# Patient Record
Sex: MALE | Race: WHITE | Employment: OTHER | URBAN - METROPOLITAN AREA
[De-identification: names, ages, dates, MRNs, and addresses within clinical notes are randomized per-mention and may not be internally consistent; named-entity substitution may affect disease eponyms.]

---

## 2017-03-31 PROBLEM — I25.10 CORONARY ARTERY DISEASE INVOLVING NATIVE HEART WITHOUT ANGINA PECTORIS: Status: ACTIVE | Noted: 2017-03-31

## 2018-09-20 PROBLEM — Z91.199 NO-SHOW FOR APPOINTMENT: Status: ACTIVE | Noted: 2018-09-20

## 2018-10-09 PROBLEM — M17.0 PRIMARY OSTEOARTHRITIS OF BOTH KNEES: Status: ACTIVE | Noted: 2018-10-09

## 2018-10-09 PROBLEM — I69.359 HEMIPARESIS AFFECTING DOMINANT SIDE AS LATE EFFECT OF CEREBROVASCULAR ACCIDENT (HCC): Status: ACTIVE | Noted: 2018-10-09

## 2019-10-15 ENCOUNTER — HOSPITAL ENCOUNTER (OUTPATIENT)
Dept: GENERAL RADIOLOGY | Age: 84
Discharge: HOME | End: 2019-10-15
Payer: MEDICARE

## 2019-10-15 DIAGNOSIS — M25.512 ACUTE PAIN OF LEFT SHOULDER: ICD-10-CM

## 2019-10-15 PROBLEM — Z91.199 NO-SHOW FOR APPOINTMENT: Status: RESOLVED | Noted: 2018-09-20 | Resolved: 2019-10-15

## 2019-10-15 PROCEDURE — 73030 X-RAY EXAM OF SHOULDER: CPT

## 2019-10-17 NOTE — PROGRESS NOTES
Left message for patient to return call
Left message for patient to return call-tried both home and cell number
Patient notified and appt scheduled for 10/18/19 at 9 am for shoulder injection.
Please call patient, no evidence of fracture on xray. Please schedule the patient for shoulder injection with me in the next few days.   Bartolo Tang MD
home

## 2020-04-21 ENCOUNTER — HOSPITAL ENCOUNTER (OUTPATIENT)
Dept: GENERAL RADIOLOGY | Age: 85
Discharge: HOME | End: 2020-04-21

## 2020-04-21 DIAGNOSIS — M54.50 ACUTE BILATERAL LOW BACK PAIN WITHOUT SCIATICA: ICD-10-CM

## 2020-04-22 PROBLEM — S32.020D COMPRESSION FRACTURE OF L2 VERTEBRA WITH ROUTINE HEALING: Status: ACTIVE | Noted: 2020-04-22

## 2020-04-22 PROBLEM — R29.6 FALLS FREQUENTLY: Status: ACTIVE | Noted: 2020-04-22

## 2020-09-09 ENCOUNTER — HOSPITAL ENCOUNTER (EMERGENCY)
Age: 85
Discharge: HOME | End: 2020-09-09
Attending: EMERGENCY MEDICINE
Payer: MEDICARE

## 2020-09-09 ENCOUNTER — APPOINTMENT (OUTPATIENT)
Dept: GENERAL RADIOLOGY | Age: 85
End: 2020-09-09
Attending: EMERGENCY MEDICINE
Payer: MEDICARE

## 2020-09-09 ENCOUNTER — APPOINTMENT (OUTPATIENT)
Dept: CT IMAGING | Age: 85
End: 2020-09-09
Attending: EMERGENCY MEDICINE
Payer: MEDICARE

## 2020-09-09 VITALS
HEART RATE: 61 BPM | TEMPERATURE: 98 F | OXYGEN SATURATION: 95 % | DIASTOLIC BLOOD PRESSURE: 95 MMHG | SYSTOLIC BLOOD PRESSURE: 197 MMHG | RESPIRATION RATE: 22 BRPM

## 2020-09-09 DIAGNOSIS — R41.82 ALTERED MENTAL STATUS, UNSPECIFIED ALTERED MENTAL STATUS TYPE: Primary | ICD-10-CM
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
## 2020-09-09 LAB
ALBUMIN SERPL-MCNC: 3.4 G/DL (ref 3.2–4.6)
ALBUMIN/GLOB SERPL: 1.1 {RATIO} (ref 1.2–3.5)
ALP SERPL-CCNC: 67 U/L (ref 50–136)
ALT SERPL-CCNC: 18 U/L (ref 12–65)
ANION GAP SERPL CALC-SCNC: 5 MMOL/L (ref 7–16)
AST SERPL-CCNC: 24 U/L (ref 15–37)
BACTERIA URNS QL MICRO: 0 /HPF
BASOPHILS # BLD: 0 K/UL (ref 0–0.2)
BASOPHILS NFR BLD: 1 % (ref 0–2)
BILIRUB SERPL-MCNC: 0.7 MG/DL (ref 0.2–1.1)
BUN SERPL-MCNC: 13 MG/DL (ref 8–23)
CALCIUM SERPL-MCNC: 8.7 MG/DL (ref 8.3–10.4)
CASTS URNS QL MICRO: 0 /LPF
CHLORIDE SERPL-SCNC: 108 MMOL/L (ref 98–107)
CO2 SERPL-SCNC: 31 MMOL/L (ref 21–32)
CREAT SERPL-MCNC: 1.08 MG/DL (ref 0.8–1.5)
DIFFERENTIAL METHOD BLD: ABNORMAL
EOSINOPHIL # BLD: 0.1 K/UL (ref 0–0.8)
EOSINOPHIL NFR BLD: 1 % (ref 0.5–7.8)
EPI CELLS #/AREA URNS HPF: 0 /HPF
ERYTHROCYTE [DISTWIDTH] IN BLOOD BY AUTOMATED COUNT: 14.4 % (ref 11.9–14.6)
ETHANOL SERPL-MCNC: 162 MG/DL
GLOBULIN SER CALC-MCNC: 3.2 G/DL (ref 2.3–3.5)
GLUCOSE SERPL-MCNC: 98 MG/DL (ref 65–100)
HCT VFR BLD AUTO: 42.5 % (ref 41.1–50.3)
HGB BLD-MCNC: 13.7 G/DL (ref 13.6–17.2)
IMM GRANULOCYTES # BLD AUTO: 0 K/UL (ref 0–0.5)
IMM GRANULOCYTES NFR BLD AUTO: 1 % (ref 0–5)
LYMPHOCYTES # BLD: 1.3 K/UL (ref 0.5–4.6)
LYMPHOCYTES NFR BLD: 30 % (ref 13–44)
MCH RBC QN AUTO: 31.5 PG (ref 26.1–32.9)
MCHC RBC AUTO-ENTMCNC: 32.2 G/DL (ref 31.4–35)
MCV RBC AUTO: 97.7 FL (ref 79.6–97.8)
MONOCYTES # BLD: 0.3 K/UL (ref 0.1–1.3)
MONOCYTES NFR BLD: 6 % (ref 4–12)
NEUTS SEG # BLD: 2.8 K/UL (ref 1.7–8.2)
NEUTS SEG NFR BLD: 62 % (ref 43–78)
NRBC # BLD: 0 K/UL (ref 0–0.2)
PLATELET # BLD AUTO: 78 K/UL (ref 150–450)
PMV BLD AUTO: 9.7 FL (ref 9.4–12.3)
POTASSIUM SERPL-SCNC: 3.3 MMOL/L (ref 3.5–5.1)
PROT SERPL-MCNC: 6.6 G/DL (ref 6.3–8.2)
RBC # BLD AUTO: 4.35 M/UL (ref 4.23–5.6)
RBC #/AREA URNS HPF: NORMAL /HPF
SODIUM SERPL-SCNC: 144 MMOL/L (ref 136–145)
WBC # BLD AUTO: 4.4 K/UL (ref 4.3–11.1)
WBC URNS QL MICRO: 0 /HPF

## 2020-09-09 PROCEDURE — 71045 X-RAY EXAM CHEST 1 VIEW: CPT

## 2020-09-09 PROCEDURE — 93005 ELECTROCARDIOGRAM TRACING: CPT | Performed by: EMERGENCY MEDICINE

## 2020-09-09 PROCEDURE — 85025 COMPLETE CBC W/AUTO DIFF WBC: CPT

## 2020-09-09 PROCEDURE — 80053 COMPREHEN METABOLIC PANEL: CPT

## 2020-09-09 PROCEDURE — 74011250637 HC RX REV CODE- 250/637: Performed by: EMERGENCY MEDICINE

## 2020-09-09 PROCEDURE — 74011250636 HC RX REV CODE- 250/636: Performed by: EMERGENCY MEDICINE

## 2020-09-09 PROCEDURE — 96361 HYDRATE IV INFUSION ADD-ON: CPT

## 2020-09-09 PROCEDURE — 81015 MICROSCOPIC EXAM OF URINE: CPT

## 2020-09-09 PROCEDURE — 70450 CT HEAD/BRAIN W/O DYE: CPT

## 2020-09-09 PROCEDURE — 80307 DRUG TEST PRSMV CHEM ANLYZR: CPT

## 2020-09-09 PROCEDURE — 99285 EMERGENCY DEPT VISIT HI MDM: CPT

## 2020-09-09 PROCEDURE — 96360 HYDRATION IV INFUSION INIT: CPT

## 2020-09-09 RX ORDER — AMLODIPINE BESYLATE 5 MG/1
5 TABLET ORAL DAILY
Status: DISCONTINUED | OUTPATIENT
Start: 2020-09-09 | End: 2020-09-10 | Stop reason: HOSPADM

## 2020-09-09 RX ORDER — AMLODIPINE BESYLATE 5 MG/1
5 TABLET ORAL DAILY
Status: DISCONTINUED | OUTPATIENT
Start: 2020-09-10 | End: 2020-09-09

## 2020-09-09 RX ADMIN — AMLODIPINE BESYLATE 5 MG: 5 TABLET ORAL at 21:24

## 2020-09-09 RX ADMIN — SODIUM CHLORIDE 500 ML: 900 INJECTION, SOLUTION INTRAVENOUS at 20:04

## 2020-09-09 NOTE — ED PROVIDER NOTES
If some altered function and lack of clarity that probably been going on all through the day. He has not been moving his left leg quite as well as he normally does so he has a deficit that is residual from a stroke involving this. Last normal was probably at least yesterday. Son who also provides much of the history states that he has had some general decline over the last 30 days or so. He continues to live with his wife with no significant daily support he does use a cane to get around. The history is provided by the patient and a relative (son in ER). Alcohol intoxication   Primary symptoms include: intoxication. There areno somnolence, no loss of consciousness, no seizures and no hallucinations present at this time. This is a new problem. Suspected agents include alcohol. Pertinent negatives include no fever.         Past Medical History:   Diagnosis Date    CAD (coronary artery disease)     Cholelithiasis 1/2014    Incidental Finding on CT Chest    Dyslipidemia     Hemorrhagic cerebrovascular accident (CVA) ([address] 5/19/2015    R basal ganglia bleed on CT scan head 5/19/15     Hypertension     PVD (peripheral vascular disease) ([address]     CXR 12/2013 - Moderate Atheroscleroitc Change in Aorta    SDH (subdural hematoma) (HCC) 5/19/2015    Skin cancer     Traumatic subdural hematoma (HCC) 5/19/2015    R sided SDH with antecedent h/o of multiple falls in weeks prior        Past Surgical History:   Procedure Laterality Date    HX CORONARY STENT PLACEMENT      LAD stent    HX MALIGNANT SKIN LESION EXCISION      right side of face         Family History:   Problem Relation Age of Onset    Cancer Brother        Social History     Socioeconomic History    Marital status:      Spouse name: Not on file    Number of children: Not on file    Years of education: Not on file    Highest education level: Not on file   Occupational History    Not on file   Social Needs    Financial resource strain: Not on file    Food insecurity     Worry: Not on file     Inability: Not on file    Transportation needs     Medical: Not on file     Non-medical: Not on file   Tobacco Use    Smoking status: Never Smoker    Smokeless tobacco: Never Used   Substance and Sexual Activity    Alcohol use: Yes     Comment: occass    Drug use: No    Sexual activity: Not on file   Lifestyle    Physical activity     Days per week: Not on file     Minutes per session: Not on file    Stress: Not on file   Relationships    Social connections     Talks on phone: Not on file     Gets together: Not on file     Attends Christian service: Not on file     Active member of club or organization: Not on file     Attends meetings of clubs or organizations: Not on file     Relationship status: Not on file    Intimate partner violence     Fear of current or ex partner: Not on file     Emotionally abused: Not on file     Physically abused: Not on file     Forced sexual activity: Not on file   Other Topics Concern    Not on file   Social History Narrative    Not on file         ALLERGIES: Patient has no known allergies. Review of Systems   Constitutional: Negative for chills, diaphoresis and fever. Respiratory: Negative for cough and wheezing. Neurological: Negative for seizures, loss of consciousness, facial asymmetry, speech difficulty, light-headedness and headaches. Psychiatric/Behavioral: Positive for decreased concentration. Negative for hallucinations. All other systems reviewed and are negative. Vitals:    09/09/20 1903 09/09/20 1905   BP: 158/87    Pulse:  (!) 58   Resp:  16   SpO2:  96%            Physical Exam  Vitals signs and nursing note reviewed. Constitutional:       General: He is not in acute distress. Appearance: He is well-developed. He is not toxic-appearing or diaphoretic. HENT:      Head: Atraumatic.       Right Ear: External ear normal.      Left Ear: External ear normal.   Eyes: General: No scleral icterus. Neck:      Musculoskeletal: Neck supple. Cardiovascular:      Rate and Rhythm: Normal rate. Pulmonary:      Effort: Pulmonary effort is normal. No respiratory distress. Abdominal:      Palpations: Abdomen is soft. Tenderness: There is no right CVA tenderness, left CVA tenderness, guarding or rebound. Musculoskeletal:         General: No tenderness or signs of injury. Skin:     General: Skin is warm and dry. Neurological:      General: No focal deficit present. Mental Status: He is alert. He is disoriented. Psychiatric:         Thought Content: Thought content normal.          MDM  Number of Diagnoses or Management Options  Altered mental status, unspecified altered mental status type:   Blood alcohol level of 120-199 mg/100 ml:   Diagnosis management comments: Progressive clearing of issues in ER and with this and blood alcohol on arrival and no other focal present cause feel this most likely issue.  Son very engaged in parent's care and will follow closely       Amount and/or Complexity of Data Reviewed  Clinical lab tests: reviewed and ordered  Tests in the radiology section of CPT®: reviewed and ordered  Obtain history from someone other than the patient: yes  Independent visualization of images, tracings, or specimens: yes    Risk of Complications, Morbidity, and/or Mortality  Presenting problems: moderate  Diagnostic procedures: low  Management options: moderate    Patient Progress  Patient progress: stable         Procedures

## 2020-09-09 NOTE — ED TRIAGE NOTES
Patient presents via EMS. Son wanted patient to be seen for left sided leg weakness. Patient has hx of stroke with left sided weakness, but son says it is worse today. States his dad has been drinking alcohol today. Patient A&Ox4.    Patient masked on arrival.

## 2020-09-10 LAB
ATRIAL RATE: 340 BPM
ATRIAL RATE: 57 BPM
CALCULATED R AXIS, ECG10: 35 DEGREES
CALCULATED R AXIS, ECG10: 40 DEGREES
CALCULATED T AXIS, ECG11: -44 DEGREES
CALCULATED T AXIS, ECG11: 44 DEGREES
DIAGNOSIS, 93000: NORMAL
DIAGNOSIS, 93000: NORMAL
Q-T INTERVAL, ECG07: 436 MS
Q-T INTERVAL, ECG07: 440 MS
QRS DURATION, ECG06: 72 MS
QRS DURATION, ECG06: 80 MS
QTC CALCULATION (BEZET), ECG08: 424 MS
QTC CALCULATION (BEZET), ECG08: 440 MS
VENTRICULAR RATE, ECG03: 57 BPM
VENTRICULAR RATE, ECG03: 60 BPM

## 2020-09-10 NOTE — DISCHARGE INSTRUCTIONS
Present CT scan shows no acute injury/bleed/mass or other abnormality  Electrolytes essentially normal along with hematologic studies  Urine shows no evidence of infection  Blood alcohol 162 (over 80 is considered intoxicated)  Make certain he is taking his medications as prescribed

## 2020-09-10 NOTE — ED NOTES
I have reviewed discharge instructions with the patient. The patient verbalized understanding. Patient left ED via Discharge Method: wheelchair to Home with son. Opportunity for questions and clarification provided. Patient given 0 scripts. The pt was in no acute distress at [place].

## 2020-09-21 PROBLEM — R39.14 BENIGN PROSTATIC HYPERPLASIA WITH INCOMPLETE BLADDER EMPTYING: Status: ACTIVE | Noted: 2020-09-21

## 2020-09-21 PROBLEM — H40.1130 PRIMARY OPEN ANGLE GLAUCOMA (POAG) OF BOTH EYES: Status: ACTIVE | Noted: 2020-09-21

## 2020-09-21 PROBLEM — F10.27 DEMENTIA ASSOCIATED WITH ALCOHOLISM WITH BEHAVIORAL DISTURBANCE (HCC): Status: ACTIVE | Noted: 2020-09-21

## 2020-09-21 PROBLEM — N40.1 BENIGN PROSTATIC HYPERPLASIA WITH INCOMPLETE BLADDER EMPTYING: Status: ACTIVE | Noted: 2020-09-21

## 2021-08-03 PROBLEM — I25.10 CAD (CORONARY ARTERY DISEASE): Status: RESOLVED | Noted: 2021-08-03 | Resolved: 2021-08-03

## 2022-03-18 PROBLEM — I25.10 CORONARY ARTERY DISEASE INVOLVING NATIVE HEART WITHOUT ANGINA PECTORIS: Status: ACTIVE | Noted: 2017-03-31

## 2022-03-18 PROBLEM — M17.0 PRIMARY OSTEOARTHRITIS OF BOTH KNEES: Status: ACTIVE | Noted: 2018-10-09

## 2022-03-19 PROBLEM — N40.1 BENIGN PROSTATIC HYPERPLASIA WITH INCOMPLETE BLADDER EMPTYING: Status: ACTIVE | Noted: 2020-09-21

## 2022-03-19 PROBLEM — R29.6 FALLS FREQUENTLY: Status: ACTIVE | Noted: 2020-04-22

## 2022-03-19 PROBLEM — F10.27 DEMENTIA ASSOCIATED WITH ALCOHOLISM WITH BEHAVIORAL DISTURBANCE (HCC): Status: ACTIVE | Noted: 2020-09-21

## 2022-03-19 PROBLEM — R39.14 BENIGN PROSTATIC HYPERPLASIA WITH INCOMPLETE BLADDER EMPTYING: Status: ACTIVE | Noted: 2020-09-21

## 2022-03-19 PROBLEM — I69.359 HEMIPARESIS AFFECTING DOMINANT SIDE AS LATE EFFECT OF CEREBROVASCULAR ACCIDENT (HCC): Status: ACTIVE | Noted: 2018-10-09

## 2022-03-20 PROBLEM — S32.020D COMPRESSION FRACTURE OF L2 VERTEBRA WITH ROUTINE HEALING: Status: ACTIVE | Noted: 2020-04-22

## 2022-03-20 PROBLEM — H40.1130 PRIMARY OPEN ANGLE GLAUCOMA (POAG) OF BOTH EYES: Status: ACTIVE | Noted: 2020-09-21

## 2022-05-27 ENCOUNTER — HOSPITAL ENCOUNTER (INPATIENT)
Age: 87
LOS: 5 days | Discharge: NURSING, REHAB OR LONG-TERM CARE | DRG: 564 | End: 2022-06-01
Attending: EMERGENCY MEDICINE | Admitting: INTERNAL MEDICINE
Payer: MEDICARE

## 2022-05-27 ENCOUNTER — HOSPITAL ENCOUNTER (EMERGENCY)
Dept: CT IMAGING | Age: 87
Discharge: HOME | DRG: 564 | End: 2022-05-30
Payer: MEDICARE

## 2022-05-27 DIAGNOSIS — Z78.9 UNABLE TO CARE FOR SELF: Primary | ICD-10-CM
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
## 2022-05-27 DIAGNOSIS — F03.90 DEMENTIA WITHOUT BEHAVIORAL DISTURBANCE, UNSPECIFIED DEMENTIA TYPE: ICD-10-CM
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
## 2022-05-27 PROBLEM — M62.82 RHABDOMYOLYSIS: Status: ACTIVE | Noted: 2022-05-27

## 2022-05-27 PROBLEM — R29.6 FALLS FREQUENTLY: Status: ACTIVE | Noted: 2020-04-22

## 2022-05-27 PROBLEM — F10.27 DEMENTIA ASSOCIATED WITH ALCOHOLISM WITH BEHAVIORAL DISTURBANCE (HCC): Status: ACTIVE | Noted: 2020-09-21

## 2022-05-27 LAB
ALBUMIN SERPL-MCNC: 3.2 G/DL (ref 3.2–4.6)
ALBUMIN/GLOB SERPL: 1 {RATIO} (ref 1.2–3.5)
ALP SERPL-CCNC: 91 U/L (ref 50–136)
ALT SERPL-CCNC: 19 U/L (ref 12–65)
ANION GAP SERPL CALC-SCNC: 5 MMOL/L (ref 7–16)
APPEARANCE UR: CLEAR
AST SERPL-CCNC: 30 U/L (ref 15–37)
BACTERIA URNS QL MICRO: 0 /HPF
BASOPHILS # BLD: 0 K/UL (ref 0–0.2)
BASOPHILS NFR BLD: 1 % (ref 0–2)
BILIRUB SERPL-MCNC: 1.9 MG/DL (ref 0.2–1.1)
BILIRUB UR QL: NEGATIVE
BUN SERPL-MCNC: 14 MG/DL (ref 8–23)
CALCIUM SERPL-MCNC: 8.9 MG/DL (ref 8.3–10.4)
CASTS URNS QL MICRO: 0 /LPF
CHLORIDE SERPL-SCNC: 108 MMOL/L (ref 98–107)
CK SERPL-CCNC: 335 U/L (ref 21–215)
CO2 SERPL-SCNC: 30 MMOL/L (ref 21–32)
COLOR UR: YELLOW
CREAT SERPL-MCNC: 1.22 MG/DL (ref 0.8–1.5)
DIFFERENTIAL METHOD BLD: ABNORMAL
EOSINOPHIL # BLD: 0 K/UL (ref 0–0.8)
EOSINOPHIL NFR BLD: 1 % (ref 0.5–7.8)
EPI CELLS #/AREA URNS HPF: ABNORMAL /HPF
ERYTHROCYTE [DISTWIDTH] IN BLOOD BY AUTOMATED COUNT: 14.8 % (ref 11.9–14.6)
GLOBULIN SER CALC-MCNC: 3.1 G/DL (ref 2.3–3.5)
GLUCOSE SERPL-MCNC: 118 MG/DL (ref 65–100)
GLUCOSE UR STRIP.AUTO-MCNC: NEGATIVE MG/DL
HCT VFR BLD AUTO: 35.8 % (ref 41.1–50.3)
HGB BLD-MCNC: 11.3 G/DL (ref 13.6–17.2)
HGB UR QL STRIP: ABNORMAL
IMM GRANULOCYTES # BLD AUTO: 0 K/UL (ref 0–0.5)
IMM GRANULOCYTES NFR BLD AUTO: 0 % (ref 0–5)
KETONES UR QL STRIP.AUTO: NEGATIVE MG/DL
LEUKOCYTE ESTERASE UR QL STRIP.AUTO: NEGATIVE
LYMPHOCYTES # BLD: 0.8 K/UL (ref 0.5–4.6)
LYMPHOCYTES NFR BLD: 19 % (ref 13–44)
MCH RBC QN AUTO: 30.5 PG (ref 26.1–32.9)
MCHC RBC AUTO-ENTMCNC: 31.6 G/DL (ref 31.4–35)
MCV RBC AUTO: 96.5 FL (ref 79.6–97.8)
MONOCYTES # BLD: 0.3 K/UL (ref 0.1–1.3)
MONOCYTES NFR BLD: 6 % (ref 4–12)
NEUTS SEG # BLD: 3.2 K/UL (ref 1.7–8.2)
NEUTS SEG NFR BLD: 73 % (ref 43–78)
NITRITE UR QL STRIP.AUTO: NEGATIVE
NRBC # BLD: 0 K/UL (ref 0–0.2)
PH UR STRIP: 6 [PH] (ref 5–9)
PLATELET # BLD AUTO: 64 K/UL (ref 150–450)
PMV BLD AUTO: 10.1 FL (ref 9.4–12.3)
POTASSIUM SERPL-SCNC: 3.3 MMOL/L (ref 3.5–5.1)
PROT SERPL-MCNC: 6.3 G/DL (ref 6.3–8.2)
PROT UR STRIP-MCNC: NEGATIVE MG/DL
RBC # BLD AUTO: 3.71 M/UL (ref 4.23–5.6)
RBC #/AREA URNS HPF: ABNORMAL /HPF
SODIUM SERPL-SCNC: 143 MMOL/L (ref 136–145)
SP GR UR REFRACTOMETRY: 1.01 (ref 1–1.02)
UROBILINOGEN UR QL STRIP.AUTO: 1 EU/DL (ref 0.2–1)
WBC # BLD AUTO: 4.3 K/UL (ref 4.3–11.1)
WBC URNS QL MICRO: ABNORMAL /HPF

## 2022-05-27 PROCEDURE — 2580000003 HC RX 258: Performed by: NURSE PRACTITIONER

## 2022-05-27 PROCEDURE — 1100000000 HC RM PRIVATE

## 2022-05-27 PROCEDURE — 80053 COMPREHEN METABOLIC PANEL: CPT

## 2022-05-27 PROCEDURE — 85025 COMPLETE CBC W/AUTO DIFF WBC: CPT

## 2022-05-27 PROCEDURE — 6370000000 HC RX 637 (ALT 250 FOR IP): Performed by: NURSE PRACTITIONER

## 2022-05-27 PROCEDURE — 70450 CT HEAD/BRAIN W/O DYE: CPT

## 2022-05-27 PROCEDURE — 2500000003 HC RX 250 WO HCPCS: Performed by: NURSE PRACTITIONER

## 2022-05-27 PROCEDURE — 99285 EMERGENCY DEPT VISIT HI MDM: CPT

## 2022-05-27 PROCEDURE — 82550 ASSAY OF CK (CPK): CPT

## 2022-05-27 PROCEDURE — 6360000002 HC RX W HCPCS: Performed by: NURSE PRACTITIONER

## 2022-05-27 RX ORDER — SODIUM CHLORIDE 0.9 % (FLUSH) 0.9 %
5-40 SYRINGE (ML) INJECTION EVERY 12 HOURS SCHEDULED
Status: DISCONTINUED | OUTPATIENT
Start: 2022-05-27 | End: 2022-06-01 | Stop reason: HOSPADM

## 2022-05-27 RX ORDER — ATORVASTATIN CALCIUM 40 MG/1
40 TABLET, FILM COATED ORAL DAILY
Status: DISCONTINUED | OUTPATIENT
Start: 2022-05-27 | End: 2022-06-01 | Stop reason: HOSPADM

## 2022-05-27 RX ORDER — POLYETHYLENE GLYCOL 3350 17 G/17G
17 POWDER, FOR SOLUTION ORAL DAILY PRN
Status: DISCONTINUED | OUTPATIENT
Start: 2022-05-27 | End: 2022-06-01 | Stop reason: HOSPADM

## 2022-05-27 RX ORDER — SODIUM CHLORIDE 9 MG/ML
INJECTION, SOLUTION INTRAVENOUS PRN
Status: DISCONTINUED | OUTPATIENT
Start: 2022-05-27 | End: 2022-06-01 | Stop reason: HOSPADM

## 2022-05-27 RX ORDER — ACETAMINOPHEN 325 MG/1
650 TABLET ORAL EVERY 6 HOURS PRN
Status: DISCONTINUED | OUTPATIENT
Start: 2022-05-27 | End: 2022-06-01 | Stop reason: HOSPADM

## 2022-05-27 RX ORDER — SODIUM CHLORIDE 0.9 % (FLUSH) 0.9 %
5-40 SYRINGE (ML) INJECTION PRN
Status: DISCONTINUED | OUTPATIENT
Start: 2022-05-27 | End: 2022-06-01 | Stop reason: HOSPADM

## 2022-05-27 RX ORDER — AMLODIPINE BESYLATE 5 MG/1
5 TABLET ORAL DAILY
Status: DISCONTINUED | OUTPATIENT
Start: 2022-05-27 | End: 2022-06-01 | Stop reason: HOSPADM

## 2022-05-27 RX ORDER — TAMSULOSIN HYDROCHLORIDE 0.4 MG/1
0.4 CAPSULE ORAL DAILY
Status: DISCONTINUED | OUTPATIENT
Start: 2022-05-27 | End: 2022-06-01 | Stop reason: HOSPADM

## 2022-05-27 RX ORDER — HEPARIN SODIUM 5000 [USP'U]/ML
5000 INJECTION, SOLUTION INTRAVENOUS; SUBCUTANEOUS EVERY 8 HOURS SCHEDULED
Status: DISCONTINUED | OUTPATIENT
Start: 2022-05-27 | End: 2022-06-01 | Stop reason: HOSPADM

## 2022-05-27 RX ORDER — ONDANSETRON 4 MG/1
4 TABLET, ORALLY DISINTEGRATING ORAL EVERY 8 HOURS PRN
Status: DISCONTINUED | OUTPATIENT
Start: 2022-05-27 | End: 2022-06-01 | Stop reason: HOSPADM

## 2022-05-27 RX ORDER — ASPIRIN 81 MG/1
81 TABLET ORAL DAILY
Status: DISCONTINUED | OUTPATIENT
Start: 2022-05-27 | End: 2022-06-01 | Stop reason: HOSPADM

## 2022-05-27 RX ORDER — ENOXAPARIN SODIUM 100 MG/ML
40 INJECTION SUBCUTANEOUS DAILY
Status: DISCONTINUED | OUTPATIENT
Start: 2022-05-27 | End: 2022-05-27

## 2022-05-27 RX ORDER — ACETAMINOPHEN 650 MG/1
650 SUPPOSITORY RECTAL EVERY 6 HOURS PRN
Status: DISCONTINUED | OUTPATIENT
Start: 2022-05-27 | End: 2022-06-01 | Stop reason: HOSPADM

## 2022-05-27 RX ORDER — ONDANSETRON 2 MG/ML
4 INJECTION INTRAMUSCULAR; INTRAVENOUS EVERY 6 HOURS PRN
Status: DISCONTINUED | OUTPATIENT
Start: 2022-05-27 | End: 2022-06-01 | Stop reason: HOSPADM

## 2022-05-27 RX ORDER — SODIUM CHLORIDE 9 MG/ML
INJECTION, SOLUTION INTRAVENOUS CONTINUOUS
Status: DISCONTINUED | OUTPATIENT
Start: 2022-05-27 | End: 2022-05-29

## 2022-05-27 RX ORDER — POTASSIUM CHLORIDE 750 MG/1
10 TABLET, EXTENDED RELEASE ORAL EVERY OTHER DAY
Status: DISCONTINUED | OUTPATIENT
Start: 2022-05-27 | End: 2022-06-01 | Stop reason: HOSPADM

## 2022-05-27 RX ADMIN — HEPARIN SODIUM 5000 UNITS: 5000 INJECTION INTRAVENOUS; SUBCUTANEOUS at 21:54

## 2022-05-27 RX ADMIN — ATORVASTATIN CALCIUM 40 MG: 40 TABLET, FILM COATED ORAL at 18:46

## 2022-05-27 RX ADMIN — TAMSULOSIN HYDROCHLORIDE 0.4 MG: 0.4 CAPSULE ORAL at 18:46

## 2022-05-27 RX ADMIN — SODIUM CHLORIDE, PRESERVATIVE FREE 10 ML: 5 INJECTION INTRAVENOUS at 21:54

## 2022-05-27 RX ADMIN — AMLODIPINE BESYLATE 5 MG: 5 TABLET ORAL at 18:46

## 2022-05-27 RX ADMIN — ASPIRIN 81 MG: 81 TABLET, COATED ORAL at 18:46

## 2022-05-27 RX ADMIN — POTASSIUM CHLORIDE 10 MEQ: 10 TABLET, EXTENDED RELEASE ORAL at 18:46

## 2022-05-27 RX ADMIN — SODIUM CHLORIDE: 9 INJECTION, SOLUTION INTRAVENOUS at 18:46

## 2022-05-27 RX ADMIN — TUBERCULIN PURIFIED PROTEIN DERIVATIVE 5 UNITS: 5 INJECTION, SOLUTION INTRADERMAL at 18:47

## 2022-05-27 ASSESSMENT — PAIN - FUNCTIONAL ASSESSMENT: PAIN_FUNCTIONAL_ASSESSMENT: NONE - DENIES PAIN

## 2022-05-27 ASSESSMENT — ENCOUNTER SYMPTOMS
ABDOMINAL DISTENTION: 0
COUGH: 0

## 2022-05-27 NOTE — CARE COORDINATION
Consult to SW from NP for discharge planning. Patient just admitted to floor. Patient listed as \"self-pay\" asked registration in the ER who states that the patient's son is bringing in his insurance cards. SW asked for PPD and PT/OT evaluations to be ordered. SW met with the patient's son to discuss short to long term options. Choice list provided for STR facilities. Son to review over night and advise SW tomorrow of top three choices. MARIANN discussed STR vs. LTC vs. halfway.      Yoko Wilkins LMSW    [facility]

## 2022-05-27 NOTE — H&P
Hospitalist History and Physical   Admit Date:  2022  2:17 PM   Name:  Nupur Pal   Age:  80 y.o. Sex:  male  :  3/12/1927   MRN:  [id number]   Room:  [address] [place] [address]    Presenting Complaint: Fall     Reason(s) for Admission: Rhabdomyolysis [M62.82]     History of Present Illness:   Nupur Pal is a 80 y.o. male with medical history of Alcohol abuse, Dementia, HTN, BPH, CVA, SDH, CAD  who presented after being found on the floor at home by his son. Unknown down time however assuming around 10 hours per family. Pt wife was also found on the floor and recalls she got up and fell and then he got up and fell over her. Pt confused at baseline. CT head without acute findings. , K+ 3.3, creatinine slightly elevated at 1.22. No family at bedside.       Review of Systems:  10 systems reviewed unable to assess  Assessment & Plan:     Principal Problem:    Rhabdomyolysis    Recheck in AM  IVF  BMP in AM    HTN  Restart home norvasc, holding ARB    Hypokalemia  Replace  BMP in AM  Check Mag    BPH  Home flomax    Protein calorie malnutrition  Temporal wasting  Add supplements  Consult nutrition            Dispo/Discharge Planning:     Pending clinical course, likely needs STR placement     Diet: regular  VTE ppx: lovenox  Code status: full     Hospital Problems           Last Modified POA    * (Principal) Rhabdomyolysis 2022 Yes          Past History:  Past Medical History:   Diagnosis Date    CAD (coronary artery disease)     Cholelithiasis 2014    Incidental Finding on CT Chest    Dyslipidemia     Hemorrhagic cerebrovascular accident (CVA) ([address] 2015    R basal ganglia bleed on CT scan head 5/19/15     Hypertension     PVD (peripheral vascular disease) ([address]     CXR 2013 - Moderate Atheroscleroitc Change in Aorta    SDH (subdural hematoma) (HCC) 2015    Skin cancer     Traumatic subdural hematoma ([address] 2015    R sided SDH with antecedent h/o of multiple falls in weeks prior      Past Surgical History:   Procedure Laterality Date    CORONARY ANGIOPLASTY WITH STENT PLACEMENT      LAD stent    MALIGNANT SKIN LESION EXCISION      right side of face      No Known Allergies   Social History     Tobacco Use    Smoking status: Never Smoker    Smokeless tobacco: Never Used   Substance Use Topics    Alcohol use: Yes      Family History   Problem Relation Age of Onset    Cancer Brother       Family history reviewed and negative except as noted above.     Immunization History   Administered Date(s) Administered    COVID-19, Pfizer Purple top, DILUTE for use, 12+ yrs, 30mcg/0.3mL dose 02/20/2021, 03/13/2021    Influenza Trivalent 10/23/2013    Influenza Virus Vaccine 09/05/2012    Influenza, High Dose (Fluzone 65 yrs and older) 10/30/2015, 09/01/2016, 09/18/2017, 10/09/2018, 08/31/2020, 11/17/2021    Influenza, Triv, inactivated, subunit, adjuvanted, IM (Fluad 65 yrs and older) 10/15/2019    PPD Test 05/20/2015    Pneumococcal Conjugate 13-valent ([id number]) 06/03/2016    Pneumococcal Polysaccharide ([id number]) 01/01/2010    Pneumococcal Vaccine 01/01/2010    Td vaccine (adult) 01/01/2007    Tdap (Boostrix, Adacel) 06/03/2016     Prior to Admit Medications:  Current Outpatient Medications   Medication Instructions    amLODIPine (NORVASC) 5 MG tablet TAKE 1 TABLET BY MOUTH DAILY    aspirin 81 MG EC tablet Oral, DAILY    atorvastatin (LIPITOR) 40 MG tablet TAKE 1 TABLET BY MOUTH DAILY    brimonidine-timolol (COMBIGAN) 0.2-0.5 % ophthalmic solution INSTILL 1 DROP IN BOTH EYES EVERY 12 HOURS    cephALEXin (KEFLEX) 500 mg, Oral, 3 TIMES DAILY    clindamycin (CLEOCIN) 300 mg, Oral, 3 TIMES DAILY    furosemide (LASIX) 20 mg, Oral, EVERY OTHER DAY    losartan (COZAAR) 25 mg, Oral, DAILY    mupirocin (BACTROBAN) 2 % ointment Apply to right leg up to three times a day    potassium chloride (KLOR-CON M) 10 MEQ extended release tablet 10 mEq, Oral, EVERY OTHER DAY    tamsulosin (FLOMAX) 0.4 MG capsule TAKE ONE CAPSULE BY MOUTH NIGHTLY         Objective:     Patient Vitals for the past 24 hrs:   Temp Pulse Resp BP SpO2   05/27/22 1645 -- 66 16 (!) 144/70 98 %   05/27/22 1630 -- 64 16 (!) 158/70 98 %   05/27/22 1600 -- 68 16 -- 98 %   05/27/22 1530 -- -- -- (!) 146/72 --   05/27/22 1423 98.5 °F (36.9 °C) 72 16 (!) 149/72 98 %       Oxygen Therapy  SpO2: 98 %  O2 Device: None (Room air)    Estimated body mass index is 27.72 kg/m² as calculated from the following:    Height as of this encounter: 5' 7\" (1.702 m). Weight as of 12/8/21: 177 lb (80.3 kg). No intake or output data in the 24 hours ending 05/27/22 1715      Physical Exam:    Blood pressure (!) 144/70, pulse 66, temperature 98.5 °F (36.9 °C), temperature source Oral, resp. rate 16, height 5' 7\" (1.702 m), SpO2 98 %. General:    Unkempt. Temporal wasting bilaterally. Head:  Normocephalic, atraumatic  Eyes:  Sclerae appear normal.  Pupils equally round. ENT:  Nares appear normal, no drainage. Moist oral mucosa  Neck:  No restricted ROM. Trachea midline   CV:   RRR. No m/r/g. No jugular venous distension. Lungs:   CTAB. No wheezing, rhonchi, or rales. Respirations even, unlabored  Abdomen: Bowel sounds present. Soft, nontender, nondistended. Extremities: No cyanosis or clubbing. No edema  Skin:     Chronic skin changes and thickening to bilateral LE. Neuro:  CN II-XII grossly intact. Sensation intact. A&O to person only. Psych:  Normal mood and affect.       I have reviewed ordered lab tests and independently visualized imaging below:    Last 24hr Labs:  Recent Results (from the past 24 hour(s))   CBC with Auto Differential    Collection Time: 05/27/22  3:34 PM   Result Value Ref Range    WBC 4.3 4.3 - 11.1 K/uL    RBC 3.71 (L) 4.23 - 5.6 M/uL    Hemoglobin 11.3 (L) 13.6 - 17.2 g/dL    Hematocrit 35.8 (L) 41.1 - 50.3 %    MCV 96.5 79.6 - 97.8 FL    MCH 30.5 26.1 - 32.9 PG    MCHC 31.6 31.4 - 35.0 g/dL RDW 14.8 (H) 11.9 - 14.6 %    Platelets 64 (L) 758 - 450 K/uL    MPV 10.1 9.4 - 12.3 FL    nRBC 0.00 0.0 - 0.2 K/uL    Differential Type AUTOMATED      Seg Neutrophils 73 43 - 78 %    Lymphocytes 19 13 - 44 %    Monocytes 6 4.0 - 12.0 %    Eosinophils % 1 0.5 - 7.8 %    Basophils 1 0.0 - 2.0 %    Immature Granulocytes 0 0.0 - 5.0 %    Segs Absolute 3.2 1.7 - 8.2 K/UL    Absolute Lymph # 0.8 0.5 - 4.6 K/UL    Absolute Mono # 0.3 0.1 - 1.3 K/UL    Absolute Eos # 0.0 0.0 - 0.8 K/UL    Basophils Absolute 0.0 0.0 - 0.2 K/UL    Absolute Immature Granulocyte 0.0 0.0 - 0.5 K/UL   Comprehensive Metabolic Panel    Collection Time: 05/27/22  3:34 PM   Result Value Ref Range    Sodium 143 136 - 145 mmol/L    Potassium 3.3 (L) 3.5 - 5.1 mmol/L    Chloride 108 (H) 98 - 107 mmol/L    CO2 30 21 - 32 mmol/L    Anion Gap 5 (L) 7 - 16 mmol/L    Glucose 118 (H) 65 - 100 mg/dL    BUN 14 8 - 23 MG/DL    CREATININE 1.22 0.8 - 1.5 MG/DL    GFR African American >60 >60 ml/min/1.73m2    GFR Non- 59 (L) >60 ml/min/1.73m2    Calcium 8.9 8.3 - 10.4 MG/DL    Total Bilirubin 1.9 (H) 0.2 - 1.1 MG/DL    ALT 19 12 - 65 U/L    AST 30 15 - 37 U/L    Alk Phosphatase 91 50 - 136 U/L    Total Protein 6.3 6.3 - 8.2 g/dL    Albumin 3.2 3.2 - 4.6 g/dL    Globulin 3.1 2.3 - 3.5 g/dL    Albumin/Globulin Ratio 1.0 (L) 1.2 - 3.5     CK    Collection Time: 05/27/22  3:34 PM   Result Value Ref Range    Total  (H) 21 - 215 U/L   Urinalysis    Collection Time: 05/27/22  3:51 PM   Result Value Ref Range    Color, UA YELLOW      Appearance CLEAR      Specific Gravity, UA 1.008 1.001 - 1.023      pH, Urine 6.0 5.0 - 9.0      Protein, UA Negative NEG mg/dL    Glucose, UA Negative mg/dL    Ketones, Urine Negative NEG mg/dL    Bilirubin Urine Negative NEG      Blood, Urine SMALL (A) NEG      Urobilinogen, Urine 1.0 0.2 - 1.0 EU/dL    Nitrite, Urine Negative NEG      Leukocyte Esterase, Urine Negative NEG      WBC, UA 0-3 0 /hpf    RBC, UA 0-3 0 /hpf    Epithelial Cells UA 0-3 0 /hpf    BACTERIA, URINE 0 0 /hpf    Casts 0 0 /lpf         Other Studies:  CT HEAD WO CONTRAST    Result Date: 5/27/2022  History: fall, found on floor Exam: CT head without contrast Technique: Thin section axial CT images were obtained from the skullbase through the vertex. Radiation dose reduction techniques were used for this study. Our CT scanners use one or all of the following: Automated exposure control, adjustment of the mA and/or kV according to patient size, use of iterative reconstruction. COMPARISON: 9/9/2020 Findings: There is a stable area of encephalomalacia within the left parietal lobe, likely related to remote infarct. Chronic appearing white matter change present in the corona radiata and centrum semiovale. There is generalized cerebral atrophy with ex vacuo dilation of the ventricles. . No extra-axial fluid collection is present. There is no mass or mass-effect. The basilar cisterns are patent. The paranasal sinuses and mastoid air cells are clear. Stable CT head without contrast. No acute intracranial abnormality. No results found for this or any previous visit. Signed:  Clara Suggs, APRN - CNP    Notes, labs, VS, diagnostic testing reviewed  Case discussed with care team, . No family at bedside.

## 2022-05-27 NOTE — PROGRESS NOTES
TRANSFER - IN REPORT:    Verbal report received from Autumn Ochoa, [facility] on Mikaela Mckeon  being received from ER for routine progression of patient care      Report consisted of patient's Situation, Background, Assessment and   Recommendations(SBAR). Information from the following report(s) ED SBAR was reviewed with the receiving nurse. Opportunity for questions and clarification was provided.       Assessment completed upon patient's arrival to unit and care will be assumed.   '

## 2022-05-27 NOTE — ED NOTES
TRANSFER - OUT REPORT:    Verbal report given to Sindi Cartagena RN on Aracely Snell  being transferred to room [id number] for routine progression of patient care       Report consisted of patient's Situation, Background, Assessment and   Recommendations(SBAR). Information from the following report(s) ED SBAR was reviewed with the receiving nurse. Lines:   Peripheral IV 05/27/22 Right Antecubital (Active)   Site Assessment Clean, dry & intact 05/27/22 1540   Line Status Blood return noted; Flushed;Normal saline locked 05/27/22 1540   Phlebitis Assessment No symptoms 05/27/22 1540   Infiltration Assessment 0 05/27/22 1540   Alcohol Cap Used No 05/27/22 1540   Dressing Type Transparent 05/27/22 1540        Opportunity for questions and clarification was provided.       Patient transported with:  Registered Nurse     Anam Bansal RN  05/27/22 3080

## 2022-05-27 NOTE — ED TRIAGE NOTES
Pt arrived to ED via EMS from home. Per EMS pt fell around 0230, found at noon today by son. Per EMS pt found lying in floor on left side. Per EMS pt denies any pain, EMS states pt has dementia. Pt alert and oriented x1, his baseline. Per EMS pt with bruise at tailbone.

## 2022-05-27 NOTE — ED PROVIDER NOTES
[employer] Emergency Department Provider Note                   PCP:                Paulo Easton MD               Age: 80 y.o. Sex: male     No diagnosis found. DISPOSITION         New Prescriptions    No medications on file       Orders Placed This Encounter   Procedures    CT HEAD WO CONTRAST    CBC with Auto Differential    Comprehensive Metabolic Panel    CK    POCT Urine Dipstick    POCT Urine Dipstick    EKG 12 Lead    Insert peripheral IV        MDM  Number of Diagnoses or Management Options  Diagnosis management comments: 59-year-old male, history of dementia, brought in by EMS for chief complaint of fall while at home. He lives with his wife who is also 95. Patient and patient's wife are on the ground for approximately 12 hours. He can no longer care for himself at home as his wife is being admitted to the hospital.  [facility] hospitalist for social admit.   Patient be admitted to hospital service with intention of eventual discharge into living facility       Amount and/or Complexity of Data Reviewed  Clinical lab tests: ordered and reviewed  Tests in the radiology section of CPT®: ordered and reviewed  Tests in the medicine section of CPT®: reviewed and ordered  Decide to obtain previous medical records or to obtain history from someone other than the patient: yes  Review and summarize past medical records: yes  Discuss the patient with other providers: yes  Independent visualization of images, tracings, or specimens: yes    Risk of Complications, Morbidity, and/or Mortality  Presenting problems: moderate  Diagnostic procedures: moderate  Management options: emely Banerjee is a 80 y.o. male who presents to the Emergency Department with chief complaint of    Chief Complaint   Patient presents with   [facility] Fall      This is a 59-year-old male who was brought to the emergency room by EMS for chief complaint of fall while at home and found down for approximately 12 hours.  His wife lives with him, she also fell and is also being brought in by EMS. Patient and patient's wife live alone however there is some has been coming by almost daily to take care of him however unfortunately he recently had a stroke and was discharged was hospitalized still recovering. He does not think that his health will allow him to continue to care for his mother and father. He is hoping to get them placed into a living facility that can take care of patient. All other systems reviewed and are negative. Review of Systems   Unable to perform ROS: Dementia   Respiratory: Negative for cough. Cardiovascular: Negative for chest pain. Gastrointestinal: Negative for abdominal distention. Musculoskeletal: Negative for arthralgias. All other systems reviewed and are negative.       Past Medical History:   Diagnosis Date    CAD (coronary artery disease)     Cholelithiasis 1/2014    Incidental Finding on CT Chest    Dyslipidemia     Hemorrhagic cerebrovascular accident (CVA) ([facility] 5/19/2015    R basal ganglia bleed on CT scan head 5/19/15     Hypertension     PVD (peripheral vascular disease) ([facility]     CXR 12/2013 - Moderate Atheroscleroitc Change in Aorta    SDH (subdural hematoma) (HCC) 5/19/2015    Skin cancer     Traumatic subdural hematoma (HCC) 5/19/2015    R sided SDH with antecedent h/o of multiple falls in weeks prior         Past Surgical History:   Procedure Laterality Date    CORONARY ANGIOPLASTY WITH STENT PLACEMENT      LAD stent    MALIGNANT SKIN LESION EXCISION      right side of face        Family History   Problem Relation Age of Onset    Cancer Brother            Social Connections:     Frequency of Communication with Friends and Family: Not on file    Frequency of Social Gatherings with Friends and Family: Not on file    Attends [employer] Services: Not on file    Active Member of Clubs or Organizations: Not on file    Attends Club or Organization Meetings: Not on file    Marital Status: Not on file        Not on File     Vitals signs and nursing note reviewed. Patient Vitals for the past 4 hrs:   Temp Pulse Resp BP SpO2   05/27/22 1423 98.5 °F (36.9 °C) 72 16 (!) 149/72 98 %          Physical Exam  Vitals and nursing note reviewed. Constitutional:       General: He is not in acute distress. Appearance: Normal appearance. He is normal weight. He is not ill-appearing, toxic-appearing or diaphoretic. HENT:      Head: Normocephalic and atraumatic. Nose: Nose normal.      Mouth/Throat:      Mouth: Mucous membranes are dry. Eyes:      Extraocular Movements: Extraocular movements intact. Pupils: Pupils are equal, round, and reactive to light. Abdominal:      General: Abdomen is flat. Palpations: Abdomen is soft. Tenderness: There is no abdominal tenderness. Musculoskeletal:         General: Normal range of motion. Skin:     Comments: abrasion to left elbow   Neurological:      General: No focal deficit present. Mental Status: He is alert. Psychiatric:         Mood and Affect: Mood normal.          EKG 12 Lead    Date/Time: 5/27/2022 3:39 PM  Performed by: AGATA Hurtado  Authorized by: Ariela Yanez MD     ECG reviewed by ED Physician in the absence of a cardiologist: yes    Previous ECG:     Previous ECG:  Unavailable  Interpretation:     Interpretation: normal    Rate:     ECG rate:  69  Rhythm:     Rhythm: sinus rhythm    Ectopy:     Ectopy: none          Labs Reviewed   CBC WITH AUTO DIFFERENTIAL   COMPREHENSIVE METABOLIC PANEL   CK        CT HEAD WO CONTRAST   Final Result   Stable CT head without contrast. No acute intracranial abnormality. [place] Coma Scale  Eye Opening: Spontaneous  Best Verbal Response: Confused  Best Motor Response: Obeys commands  [place] Coma Scale Score: 14                    Voice dictation software was used during the making of this note.   This software is not perfect and grammatical and other typographical errors may be present. This note has not been completely proofread for errors.      Felix Codyma  05/27/22 1875

## 2022-05-28 ENCOUNTER — APPOINTMENT (OUTPATIENT)
Dept: GENERAL RADIOLOGY | Age: 87
DRG: 564 | End: 2022-05-28
Payer: MEDICARE

## 2022-05-28 LAB
ALBUMIN SERPL-MCNC: 3.2 G/DL (ref 3.2–4.6)
ALBUMIN/GLOB SERPL: 1.1 {RATIO} (ref 1.2–3.5)
ALP SERPL-CCNC: 96 U/L (ref 50–136)
ALT SERPL-CCNC: 19 U/L (ref 12–65)
ANION GAP SERPL CALC-SCNC: 9 MMOL/L (ref 7–16)
AST SERPL-CCNC: 36 U/L (ref 15–37)
BASOPHILS # BLD: 0 K/UL (ref 0–0.2)
BASOPHILS NFR BLD: 1 % (ref 0–2)
BILIRUB SERPL-MCNC: 2 MG/DL (ref 0.2–1.1)
BUN SERPL-MCNC: 12 MG/DL (ref 8–23)
CALCIUM SERPL-MCNC: 8.8 MG/DL (ref 8.3–10.4)
CHLORIDE SERPL-SCNC: 109 MMOL/L (ref 98–107)
CO2 SERPL-SCNC: 25 MMOL/L (ref 21–32)
CREAT SERPL-MCNC: 1.01 MG/DL (ref 0.8–1.5)
DIFFERENTIAL METHOD BLD: ABNORMAL
EOSINOPHIL # BLD: 0.1 K/UL (ref 0–0.8)
EOSINOPHIL NFR BLD: 2 % (ref 0.5–7.8)
ERYTHROCYTE [DISTWIDTH] IN BLOOD BY AUTOMATED COUNT: 14.9 % (ref 11.9–14.6)
GLOBULIN SER CALC-MCNC: 2.9 G/DL (ref 2.3–3.5)
GLUCOSE SERPL-MCNC: 129 MG/DL (ref 65–100)
HCT VFR BLD AUTO: 35.7 % (ref 41.1–50.3)
HGB BLD-MCNC: 11.4 G/DL (ref 13.6–17.2)
IMM GRANULOCYTES # BLD AUTO: 0 K/UL (ref 0–0.5)
IMM GRANULOCYTES NFR BLD AUTO: 1 % (ref 0–5)
LYMPHOCYTES # BLD: 1 K/UL (ref 0.5–4.6)
LYMPHOCYTES NFR BLD: 29 % (ref 13–44)
MAGNESIUM SERPL-MCNC: 2.2 MG/DL (ref 1.8–2.4)
MCH RBC QN AUTO: 30.3 PG (ref 26.1–32.9)
MCHC RBC AUTO-ENTMCNC: 31.9 G/DL (ref 31.4–35)
MCV RBC AUTO: 94.9 FL (ref 79.6–97.8)
MM INDURATION, POC: 0 MM (ref 0–5)
MONOCYTES # BLD: 0.2 K/UL (ref 0.1–1.3)
MONOCYTES NFR BLD: 7 % (ref 4–12)
NEUTS SEG # BLD: 2.1 K/UL (ref 1.7–8.2)
NEUTS SEG NFR BLD: 60 % (ref 43–78)
NRBC # BLD: 0 K/UL (ref 0–0.2)
PLATELET # BLD AUTO: 60 K/UL (ref 150–450)
PMV BLD AUTO: 10.2 FL (ref 9.4–12.3)
POTASSIUM SERPL-SCNC: 3.1 MMOL/L (ref 3.5–5.1)
PPD, POC: NEGATIVE
PROT SERPL-MCNC: 6.1 G/DL (ref 6.3–8.2)
RBC # BLD AUTO: 3.76 M/UL (ref 4.23–5.6)
SODIUM SERPL-SCNC: 143 MMOL/L (ref 136–145)
WBC # BLD AUTO: 3.5 K/UL (ref 4.3–11.1)

## 2022-05-28 PROCEDURE — 85025 COMPLETE CBC W/AUTO DIFF WBC: CPT

## 2022-05-28 PROCEDURE — 6360000002 HC RX W HCPCS: Performed by: INTERNAL MEDICINE

## 2022-05-28 PROCEDURE — 71045 X-RAY EXAM CHEST 1 VIEW: CPT

## 2022-05-28 PROCEDURE — 2580000003 HC RX 258: Performed by: NURSE PRACTITIONER

## 2022-05-28 PROCEDURE — 1100000000 HC RM PRIVATE

## 2022-05-28 PROCEDURE — 83735 ASSAY OF MAGNESIUM: CPT

## 2022-05-28 PROCEDURE — 80053 COMPREHEN METABOLIC PANEL: CPT

## 2022-05-28 PROCEDURE — 6360000002 HC RX W HCPCS: Performed by: NURSE PRACTITIONER

## 2022-05-28 PROCEDURE — 36415 COLL VENOUS BLD VENIPUNCTURE: CPT

## 2022-05-28 RX ORDER — LORAZEPAM 2 MG/ML
0.5 INJECTION INTRAMUSCULAR EVERY 6 HOURS PRN
Status: DISCONTINUED | OUTPATIENT
Start: 2022-05-28 | End: 2022-06-01 | Stop reason: HOSPADM

## 2022-05-28 RX ORDER — DIVALPROEX SODIUM 125 MG/1
125 CAPSULE, COATED PELLETS ORAL EVERY 8 HOURS SCHEDULED
Status: DISCONTINUED | OUTPATIENT
Start: 2022-05-28 | End: 2022-06-01 | Stop reason: HOSPADM

## 2022-05-28 RX ORDER — POTASSIUM CHLORIDE 7.45 MG/ML
10 INJECTION INTRAVENOUS ONCE
Status: COMPLETED | OUTPATIENT
Start: 2022-05-28 | End: 2022-05-28

## 2022-05-28 RX ADMIN — ONDANSETRON 4 MG: 2 INJECTION INTRAMUSCULAR; INTRAVENOUS at 10:05

## 2022-05-28 RX ADMIN — LORAZEPAM 0.5 MG: 2 INJECTION INTRAMUSCULAR; INTRAVENOUS at 20:56

## 2022-05-28 RX ADMIN — HEPARIN SODIUM 5000 UNITS: 5000 INJECTION INTRAVENOUS; SUBCUTANEOUS at 15:23

## 2022-05-28 RX ADMIN — SODIUM CHLORIDE, PRESERVATIVE FREE 10 ML: 5 INJECTION INTRAVENOUS at 20:57

## 2022-05-28 RX ADMIN — HEPARIN SODIUM 5000 UNITS: 5000 INJECTION INTRAVENOUS; SUBCUTANEOUS at 20:57

## 2022-05-28 RX ADMIN — POTASSIUM CHLORIDE 10 MEQ: 7.46 INJECTION, SOLUTION INTRAVENOUS at 10:12

## 2022-05-28 RX ADMIN — LORAZEPAM 0.5 MG: 2 INJECTION INTRAMUSCULAR; INTRAVENOUS at 10:05

## 2022-05-28 RX ADMIN — HEPARIN SODIUM 5000 UNITS: 5000 INJECTION INTRAVENOUS; SUBCUTANEOUS at 07:44

## 2022-05-28 ASSESSMENT — PAIN SCALES - PAIN ASSESSMENT IN ADVANCED DEMENTIA (PAINAD)
TOTALSCORE: 0
FACIALEXPRESSION: 0
BREATHING: 0
BODYLANGUAGE: 0
NEGVOCALIZATION: 0
CONSOLABILITY: 0

## 2022-05-28 NOTE — PROGRESS NOTES
Patient is agitated, cursing, hitting and kicking at staff. Patient continues to try to get out of bed unassisted.

## 2022-05-28 NOTE — PROGRESS NOTES
Patient is confused and continually trying to get out of bed unassisted. Patient hit nurse, using profanity toward staff and is uncooperative.

## 2022-05-28 NOTE — CARE COORDINATION
SW met with the patient's son and daughter-in-law who state that their SNF choices are [facility], [place], [facility], or [facility]. SW advised that [facility] is not necessarily the best place for potential LTC. However, son states that their plan is for the patient to go to [facility] and then transition into [facility] at [facility] which the family has already toured. Referrals to be sent once the patient is able to participate in a therapy evaluation. Information provided to the patient's son regarding [facility] placement companies, a directory of area ALFs, and private caregiver education/flyers.      Yoko Wilkins LMSW    [facility]

## 2022-05-28 NOTE — PROGRESS NOTES
Progress Note    Patient: Shira Church MRN: [id number]  SSN: [id number]    YOB: 1927  Age: 80 y.o. Sex: male      Admit Date: 5/27/2022    LOS: 1 day     Assessment and Plan:    80 y.o. male with medical history of Alcohol abuse, Dementia, HTN, BPH, CVA, SDH, CAD  who presented after being found on the floor at home by his son    1. Fall  -CT of the brain without acute intracranial abnormalities  -Obtain chest x-ray  -PT and OT evaluation    2. Encephalopathy / Dementia  -CT abdomen without acute intracranial abnormalities  -Urinalysis without pyuria or bacteriuria  -Obtain chest x-ray  -Currently no signs of infection  -Monitor mental status  -Avoid sedatives  -Delirium precautions    3. Mildly elevated CK  -Continue IV fluids for now  -Monitor renal function  -Monitor CK    4. Hypertension  -Continue amlodipine    5. Hypokalemia  -Replete  -Monitor K    DVT prophylaxis with heparin subcu      Subjective:   80 y.o. male with medical history of Alcohol abuse, Dementia, HTN, BPH, CVA, SDH, CAD  who presented after being found on the floor at home by his son. Patient seen and examined at bedside. This morning the patient alert but confused. Denies any chest pain, no shortness of breath, no abdominal pain. Objective:     Vitals:    05/28/22 0046 05/28/22 0329 05/28/22 0741 05/28/22 1113   BP: 130/80 129/74 (!) 140/75 113/73   Pulse: 60 68 69 91   Resp: 17 15 14 16   Temp: 97.6 °F (36.4 °C) 97.5 °F (36.4 °C) 98.1 °F (36.7 °C) 97.9 °F (36.6 °C)   TempSrc: Axillary Axillary Oral Axillary   SpO2: 98% 98% 97%    Weight:       Height:            Intake and Output:  Current Shift: No intake/output data recorded.   Last three shifts: 05/26 1901 - 05/28 0700  In: 229 [P.O.:229]  Out: 300 [Urine:300]    ROS  10 ROS negative except from stated on subjective    Physical Exam:   General: Alert, confused, NAD  HEENT: NC/AT, EOM are intact  Neck: supple, no JVD  Cardiovascular: RRR, S1, S2, no murmurs  Respiratory: Lungs are clear, no wheezes or rales  Abdomen: Soft, NT, ND  Back: No CVA tenderness, no paraspinal tenderness  Extremities: LE without pedal edema, no erythema  Neuro: CN are intact, no focal deficits  Skin: no rash or ulcers      Lab/Data Review:  I have personally reviewed patients laboratory data showing  Recent Results (from the past 24 hour(s))   CBC with Auto Differential    Collection Time: 05/27/22  3:34 PM   Result Value Ref Range    WBC 4.3 4.3 - 11.1 K/uL    RBC 3.71 (L) 4.23 - 5.6 M/uL    Hemoglobin 11.3 (L) 13.6 - 17.2 g/dL    Hematocrit 35.8 (L) 41.1 - 50.3 %    MCV 96.5 79.6 - 97.8 FL    MCH 30.5 26.1 - 32.9 PG    MCHC 31.6 31.4 - 35.0 g/dL    RDW 14.8 (H) 11.9 - 14.6 %    Platelets 64 (L) 887 - 450 K/uL    MPV 10.1 9.4 - 12.3 FL    nRBC 0.00 0.0 - 0.2 K/uL    Differential Type AUTOMATED      Seg Neutrophils 73 43 - 78 %    Lymphocytes 19 13 - 44 %    Monocytes 6 4.0 - 12.0 %    Eosinophils % 1 0.5 - 7.8 %    Basophils 1 0.0 - 2.0 %    Immature Granulocytes 0 0.0 - 5.0 %    Segs Absolute 3.2 1.7 - 8.2 K/UL    Absolute Lymph # 0.8 0.5 - 4.6 K/UL    Absolute Mono # 0.3 0.1 - 1.3 K/UL    Absolute Eos # 0.0 0.0 - 0.8 K/UL    Basophils Absolute 0.0 0.0 - 0.2 K/UL    Absolute Immature Granulocyte 0.0 0.0 - 0.5 K/UL   Comprehensive Metabolic Panel    Collection Time: 05/27/22  3:34 PM   Result Value Ref Range    Sodium 143 136 - 145 mmol/L    Potassium 3.3 (L) 3.5 - 5.1 mmol/L    Chloride 108 (H) 98 - 107 mmol/L    CO2 30 21 - 32 mmol/L    Anion Gap 5 (L) 7 - 16 mmol/L    Glucose 118 (H) 65 - 100 mg/dL    BUN 14 8 - 23 MG/DL    CREATININE 1.22 0.8 - 1.5 MG/DL    GFR African American >60 >60 ml/min/1.73m2    GFR Non- 59 (L) >60 ml/min/1.73m2    Calcium 8.9 8.3 - 10.4 MG/DL    Total Bilirubin 1.9 (H) 0.2 - 1.1 MG/DL    ALT 19 12 - 65 U/L    AST 30 15 - 37 U/L    Alk Phosphatase 91 50 - 136 U/L    Total Protein 6.3 6.3 - 8.2 g/dL    Albumin 3.2 3.2 - 4.6 g/dL    Globulin 3.1 2.3 - 3.5 g/dL    Albumin/Globulin Ratio 1.0 (L) 1.2 - 3.5     CK    Collection Time: 05/27/22  3:34 PM   Result Value Ref Range    Total  (H) 21 - 215 U/L   EKG 12 Lead    Collection Time: 05/27/22  3:37 PM   Result Value Ref Range    Ventricular Rate 69 BPM    Atrial Rate 156 BPM    QRS Duration 128 ms    Q-T Interval 406 ms    QTc Calculation (Bazett) 435 ms    P Axis 0 degrees    R Axis -16 degrees    T Axis -14 degrees    Diagnosis !! AGE AND GENDER SPECIFIC ECG ANALYSIS !!     Urinalysis    Collection Time: 05/27/22  3:51 PM   Result Value Ref Range    Color, UA YELLOW      Appearance CLEAR      Specific Gravity, UA 1.008 1.001 - 1.023      pH, Urine 6.0 5.0 - 9.0      Protein, UA Negative NEG mg/dL    Glucose, UA Negative mg/dL    Ketones, Urine Negative NEG mg/dL    Bilirubin Urine Negative NEG      Blood, Urine SMALL (A) NEG      Urobilinogen, Urine 1.0 0.2 - 1.0 EU/dL    Nitrite, Urine Negative NEG      Leukocyte Esterase, Urine Negative NEG      WBC, UA 0-3 0 /hpf    RBC, UA 0-3 0 /hpf    Epithelial Cells UA 0-3 0 /hpf    BACTERIA, URINE 0 0 /hpf    Casts 0 0 /lpf   Comprehensive Metabolic Panel w/ Reflex to MG    Collection Time: 05/28/22  5:50 AM   Result Value Ref Range    Sodium 143 136 - 145 mmol/L    Potassium 3.1 (L) 3.5 - 5.1 mmol/L    Chloride 109 (H) 98 - 107 mmol/L    CO2 25 21 - 32 mmol/L    Anion Gap 9 7 - 16 mmol/L    Glucose 129 (H) 65 - 100 mg/dL    BUN 12 8 - 23 MG/DL    CREATININE 1.01 0.8 - 1.5 MG/DL    GFR African American >60 >60 ml/min/1.73m2    GFR Non- >60 >60 ml/min/1.73m2    Calcium 8.8 8.3 - 10.4 MG/DL    Total Bilirubin 2.0 (H) 0.2 - 1.1 MG/DL    ALT 19 12 - 65 U/L    AST 36 15 - 37 U/L    Alk Phosphatase 96 50 - 136 U/L    Total Protein 6.1 (L) 6.3 - 8.2 g/dL    Albumin 3.2 3.2 - 4.6 g/dL    Globulin 2.9 2.3 - 3.5 g/dL    Albumin/Globulin Ratio 1.1 (L) 1.2 - 3.5     CBC with Auto Differential    Collection Time: 05/28/22  5:50 AM   Result Value Ref Range    WBC 3.5 (L) 4.3 - 11.1 K/uL    RBC 3.76 (L) 4.23 - 5.6 M/uL    Hemoglobin 11.4 (L) 13.6 - 17.2 g/dL    Hematocrit 35.7 (L) 41.1 - 50.3 %    MCV 94.9 79.6 - 97.8 FL    MCH 30.3 26.1 - 32.9 PG    MCHC 31.9 31.4 - 35.0 g/dL    RDW 14.9 (H) 11.9 - 14.6 %    Platelets 60 (L) 057 - 450 K/uL    MPV 10.2 9.4 - 12.3 FL    nRBC 0.00 0.0 - 0.2 K/uL    Differential Type AUTOMATED      Seg Neutrophils 60 43 - 78 %    Lymphocytes 29 13 - 44 %    Monocytes 7 4.0 - 12.0 %    Eosinophils % 2 0.5 - 7.8 %    Basophils 1 0.0 - 2.0 %    Immature Granulocytes 1 0.0 - 5.0 %    Segs Absolute 2.1 1.7 - 8.2 K/UL    Absolute Lymph # 1.0 0.5 - 4.6 K/UL    Absolute Mono # 0.2 0.1 - 1.3 K/UL    Absolute Eos # 0.1 0.0 - 0.8 K/UL    Basophils Absolute 0.0 0.0 - 0.2 K/UL    Absolute Immature Granulocyte 0.0 0.0 - 0.5 K/UL   Magnesium    Collection Time: 05/28/22  5:50 AM   Result Value Ref Range    Magnesium 2.2 1.8 - 2.4 mg/dL      [unfilled]     Image:  I have personally reviewed patients imaging showing  CT HEAD WO CONTRAST   Final Result   Stable CT head without contrast. No acute intracranial abnormality.                  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        Signed By: Fadi Mejía MD     May 28, 2022

## 2022-05-28 NOTE — PROGRESS NOTES
PT & OT deferred evaluations today due to this pt still being very agitated & combative with staff. Therapy will follow up 5/28/22 as indicated.    Marita Wright, PT, SAV

## 2022-05-28 NOTE — PLAN OF CARE
Problem: Safety - Adult  Goal: Free from fall injury  Outcome: Progressing     Problem: ABCDS Injury Assessment  Goal: Absence of physical injury  Outcome: Progressing     Problem: Confusion  Goal: Confusion, delirium, dementia, or psychosis is improved or at baseline  Description: INTERVENTIONS:  1. Assess for possible contributors to thought disturbance, including medications, impaired vision or hearing, underlying metabolic abnormalities, dehydration, psychiatric diagnoses, and notify attending LIP  2. [place] high risk fall precautions, as indicated  3. Provide frequent short contacts to provide reality reorientation, refocusing and direction  4. Decrease environmental stimuli, including noise as appropriate  5. Monitor and intervene to maintain adequate nutrition, hydration, elimination, sleep and activity  6. If unable to ensure safety without constant attention obtain sitter and review sitter guidelines with assigned personnel  7.  Initiate Psychosocial CNS and Spiritual Care consult, as indicated  Outcome: Progressing

## 2022-05-29 ENCOUNTER — APPOINTMENT (OUTPATIENT)
Dept: MRI IMAGING | Age: 87
DRG: 564 | End: 2022-05-29
Payer: MEDICARE

## 2022-05-29 LAB
ANION GAP SERPL CALC-SCNC: 11 MMOL/L (ref 7–16)
BASOPHILS # BLD: 0 K/UL (ref 0–0.2)
BASOPHILS NFR BLD: 1 % (ref 0–2)
BUN SERPL-MCNC: 11 MG/DL (ref 8–23)
CALCIUM SERPL-MCNC: 9.4 MG/DL (ref 8.3–10.4)
CHLORIDE SERPL-SCNC: 106 MMOL/L (ref 98–107)
CK SERPL-CCNC: 1773 U/L (ref 21–215)
CO2 SERPL-SCNC: 25 MMOL/L (ref 21–32)
CREAT SERPL-MCNC: 1.09 MG/DL (ref 0.8–1.5)
DIFFERENTIAL METHOD BLD: ABNORMAL
EOSINOPHIL # BLD: 0 K/UL (ref 0–0.8)
EOSINOPHIL NFR BLD: 0 % (ref 0.5–7.8)
ERYTHROCYTE [DISTWIDTH] IN BLOOD BY AUTOMATED COUNT: 14.7 % (ref 11.9–14.6)
GLUCOSE SERPL-MCNC: 120 MG/DL (ref 65–100)
HCT VFR BLD AUTO: 40.5 % (ref 41.1–50.3)
HGB BLD-MCNC: 12.9 G/DL (ref 13.6–17.2)
IMM GRANULOCYTES # BLD AUTO: 0 K/UL (ref 0–0.5)
IMM GRANULOCYTES NFR BLD AUTO: 0 % (ref 0–5)
LYMPHOCYTES # BLD: 0.8 K/UL (ref 0.5–4.6)
LYMPHOCYTES NFR BLD: 14 % (ref 13–44)
MCH RBC QN AUTO: 29.7 PG (ref 26.1–32.9)
MCHC RBC AUTO-ENTMCNC: 31.9 G/DL (ref 31.4–35)
MCV RBC AUTO: 93.1 FL (ref 79.6–97.8)
MONOCYTES # BLD: 0.3 K/UL (ref 0.1–1.3)
MONOCYTES NFR BLD: 6 % (ref 4–12)
NEUTS SEG # BLD: 4.3 K/UL (ref 1.7–8.2)
NEUTS SEG NFR BLD: 79 % (ref 43–78)
NRBC # BLD: 0 K/UL (ref 0–0.2)
PLATELET # BLD AUTO: 75 K/UL (ref 150–450)
PMV BLD AUTO: 10.3 FL (ref 9.4–12.3)
POTASSIUM SERPL-SCNC: 3.7 MMOL/L (ref 3.5–5.1)
RBC # BLD AUTO: 4.35 M/UL (ref 4.23–5.6)
SODIUM SERPL-SCNC: 142 MMOL/L (ref 136–145)
WBC # BLD AUTO: 5.5 K/UL (ref 4.3–11.1)

## 2022-05-29 PROCEDURE — 82550 ASSAY OF CK (CPK): CPT

## 2022-05-29 PROCEDURE — 2580000003 HC RX 258: Performed by: INTERNAL MEDICINE

## 2022-05-29 PROCEDURE — 2500000003 HC RX 250 WO HCPCS: Performed by: INTERNAL MEDICINE

## 2022-05-29 PROCEDURE — 6360000002 HC RX W HCPCS: Performed by: NURSE PRACTITIONER

## 2022-05-29 PROCEDURE — 85025 COMPLETE CBC W/AUTO DIFF WBC: CPT

## 2022-05-29 PROCEDURE — 1100000000 HC RM PRIVATE

## 2022-05-29 PROCEDURE — 6360000002 HC RX W HCPCS: Performed by: PSYCHIATRY & NEUROLOGY

## 2022-05-29 PROCEDURE — 99222 1ST HOSP IP/OBS MODERATE 55: CPT | Performed by: PSYCHIATRY & NEUROLOGY

## 2022-05-29 PROCEDURE — 2580000003 HC RX 258: Performed by: NURSE PRACTITIONER

## 2022-05-29 PROCEDURE — 36415 COLL VENOUS BLD VENIPUNCTURE: CPT

## 2022-05-29 PROCEDURE — 80048 BASIC METABOLIC PNL TOTAL CA: CPT

## 2022-05-29 PROCEDURE — 6360000002 HC RX W HCPCS: Performed by: INTERNAL MEDICINE

## 2022-05-29 PROCEDURE — 6370000000 HC RX 637 (ALT 250 FOR IP): Performed by: INTERNAL MEDICINE

## 2022-05-29 PROCEDURE — 70551 MRI BRAIN STEM W/O DYE: CPT

## 2022-05-29 RX ORDER — SODIUM CHLORIDE, SODIUM LACTATE, POTASSIUM CHLORIDE, CALCIUM CHLORIDE 600; 310; 30; 20 MG/100ML; MG/100ML; MG/100ML; MG/100ML
INJECTION, SOLUTION INTRAVENOUS CONTINUOUS
Status: DISCONTINUED | OUTPATIENT
Start: 2022-05-29 | End: 2022-06-01 | Stop reason: HOSPADM

## 2022-05-29 RX ORDER — THIAMINE HYDROCHLORIDE 100 MG/ML
200 INJECTION, SOLUTION INTRAMUSCULAR; INTRAVENOUS DAILY
Status: DISCONTINUED | OUTPATIENT
Start: 2022-05-29 | End: 2022-05-30 | Stop reason: SDUPTHER

## 2022-05-29 RX ADMIN — LORAZEPAM 0.5 MG: 2 INJECTION INTRAMUSCULAR; INTRAVENOUS at 09:44

## 2022-05-29 RX ADMIN — THIAMINE HYDROCHLORIDE 200 MG: 100 INJECTION, SOLUTION INTRAMUSCULAR; INTRAVENOUS at 12:37

## 2022-05-29 RX ADMIN — SODIUM CHLORIDE, PRESERVATIVE FREE 10 ML: 5 INJECTION INTRAVENOUS at 22:03

## 2022-05-29 RX ADMIN — DOXYCYCLINE 100 MG: 100 INJECTION, POWDER, LYOPHILIZED, FOR SOLUTION INTRAVENOUS at 09:45

## 2022-05-29 RX ADMIN — DOXYCYCLINE 100 MG: 100 INJECTION, POWDER, LYOPHILIZED, FOR SOLUTION INTRAVENOUS at 22:03

## 2022-05-29 RX ADMIN — LORAZEPAM 0.5 MG: 2 INJECTION INTRAMUSCULAR; INTRAVENOUS at 04:10

## 2022-05-29 RX ADMIN — HEPARIN SODIUM 5000 UNITS: 5000 INJECTION INTRAVENOUS; SUBCUTANEOUS at 22:03

## 2022-05-29 RX ADMIN — CEFTRIAXONE 1000 MG: 1 INJECTION, POWDER, FOR SOLUTION INTRAMUSCULAR; INTRAVENOUS at 09:45

## 2022-05-29 RX ADMIN — DIVALPROEX SODIUM 125 MG: 125 CAPSULE ORAL at 13:11

## 2022-05-29 RX ADMIN — SODIUM CHLORIDE, SODIUM LACTATE, POTASSIUM CHLORIDE, AND CALCIUM CHLORIDE: 600; 310; 30; 20 INJECTION, SOLUTION INTRAVENOUS at 22:03

## 2022-05-29 RX ADMIN — HEPARIN SODIUM 5000 UNITS: 5000 INJECTION INTRAVENOUS; SUBCUTANEOUS at 06:16

## 2022-05-29 RX ADMIN — HEPARIN SODIUM 5000 UNITS: 5000 INJECTION INTRAVENOUS; SUBCUTANEOUS at 13:11

## 2022-05-29 RX ADMIN — SODIUM CHLORIDE, SODIUM LACTATE, POTASSIUM CHLORIDE, AND CALCIUM CHLORIDE: 600; 310; 30; 20 INJECTION, SOLUTION INTRAVENOUS at 12:09

## 2022-05-29 ASSESSMENT — PAIN SCALES - PAIN ASSESSMENT IN ADVANCED DEMENTIA (PAINAD)
BODYLANGUAGE: 0
FACIALEXPRESSION: 0
FACIALEXPRESSION: 0
NEGVOCALIZATION: 0
TOTALSCORE: 0
TOTALSCORE: 0
CONSOLABILITY: 0
CONSOLABILITY: 0
FACIALEXPRESSION: 0
BREATHING: 0
NEGVOCALIZATION: 0
BREATHING: 0
BREATHING: 0
CONSOLABILITY: 0
TOTALSCORE: 0
BODYLANGUAGE: 0
NEGVOCALIZATION: 0
BODYLANGUAGE: 0

## 2022-05-29 NOTE — PROGRESS NOTES
Occupational Therapy Hold Note:    Pt currently on hold for occupational therapy per RN due to sedation and pt not following commands. Will plan to provide OT evaluation once pt is medically appropriate to resume OT services.     Belkis Marshall, OT    [facility]

## 2022-05-29 NOTE — PROGRESS NOTES
Physical Therapy Hold Note    Pt currently on hold for physical therapy per RN due to sedation and pt not following commands. Will plan to provide PT evaluation once pt is medically appropriate to resume PT services.     [facility], PT

## 2022-05-29 NOTE — CONSULTS
[facility] [place] [facility] [address]  [place] [address], [address] [facility]            Mikaela Mckeon is a 80 y.o. male who presents on referral from the [facility] for a telemedicine evaluation of altered mental status  As the patient is very agitated and confused that mode of interaction will not be feasible  He was admitted to the hospital after having a fall at home being down for a number of hours has evidence of rhabdomyolysis with an elevated CPK of 1700  Urinalysis shows evidence of a small amount of blood which likely reflects the presence of myoglobinuria. There is a history of alcohol usage it is unclear how current that currently is      Past Medical History:   Diagnosis Date    CAD (coronary artery disease)     Cholelithiasis 1/2014    Incidental Finding on CT Chest    Dyslipidemia     Hemorrhagic cerebrovascular accident (CVA) ([address] 5/19/2015    R basal ganglia bleed on CT scan head 5/19/15     Hypertension     PVD (peripheral vascular disease) ([address]     CXR 12/2013 - Moderate Atheroscleroitc Change in Aorta    SDH (subdural hematoma) (HCC) 5/19/2015    Skin cancer     Traumatic subdural hematoma (HCC) 5/19/2015    R sided SDH with antecedent h/o of multiple falls in weeks prior        Past Surgical History:   Procedure Laterality Date    CORONARY ANGIOPLASTY WITH STENT PLACEMENT      LAD stent    MALIGNANT SKIN LESION EXCISION      right side of face        Family History   Problem Relation Age of Onset    Cancer Brother         Social History     Socioeconomic History    Marital status:      Spouse name: None    Number of children: None    Years of education: None    Highest education level: None   Occupational History    None   Tobacco Use    Smoking status: Never Smoker    Smokeless tobacco: Never Used   Substance and Sexual Activity    Alcohol use:  Yes    Drug use: No    Sexual activity: None   Other Topics Concern    None   Social History Narrative  None     Social Determinants of Health     Financial Resource Strain:     Difficulty of Paying Living Expenses: Not on file   Food Insecurity:     Worried About Running Out of Food in the Last Year: Not on file    Bryson of Food in the Last Year: Not on file   Transportation Needs:     Lack of Transportation (Medical): Not on file    Lack of Transportation (Non-Medical):  Not on file   Physical Activity:     Days of Exercise per Week: Not on file    Minutes of Exercise per Session: Not on file   Stress:     Feeling of Stress : Not on file   Social Connections:     Frequency of Communication with Friends and Family: Not on file    Frequency of Social Gatherings with Friends and Family: Not on file    Attends [employer] Services: Not on file    Active Member of Clubs or Organizations: Not on file    Attends Club or Organization Meetings: Not on file    Marital Status: Not on file   Intimate Partner Violence:     Fear of Current or Ex-Partner: Not on file    Emotionally Abused: Not on file    Physically Abused: Not on file    Sexually Abused: Not on file   Housing Stability:     Unable to Pay for Housing in the Last Year: Not on file    Number of [place] in the Last Year: Not on file    Unstable Housing in the Last Year: Not on file         Current Facility-Administered Medications   Medication Dose Route Frequency Provider Last Rate Last Admin    cefTRIAXone (ROCEPHIN) 1000 mg IVPB in NS 50ml minibag  1,000 mg IntraVENous Q24H Dagoberto Adams  mL/hr at 05/29/22 0945 1,000 mg at 05/29/22 0945    doxycycline (VIBRAMYCIN) 100 mg in sodium chloride 0.9 % 100 mL IVPB  100 mg IntraVENous Q12H Dagoberto Adams  mL/hr at 05/29/22 0945 100 mg at 05/29/22 0945    lactated ringers infusion   IntraVENous Continuous Dagoberto Adams MD        divalproex (DEPAKOTE SPRINKLE) capsule 125 mg  125 mg Oral 3 times per day Dagoberto Adams MD        LORazepam (ATIVAN) injection 0.5 mg  0.5 mg IntraVENous Q6H PRN Diana Ford MD   0.5 mg at 05/29/22 0944    tamsulosin (FLOMAX) capsule 0.4 mg  0.4 mg Oral Daily Dave Estevez APRN - CNP   0.4 mg at 05/27/22 1846    potassium chloride (KLOR-CON M) extended release tablet 10 mEq  10 mEq Oral Every Other Day Dave Estevez APRN - CNP   10 mEq at 05/27/22 1846    amLODIPine (NORVASC) tablet 5 mg  5 mg Oral Daily Dave Estevez APRN - CNP   5 mg at 05/27/22 1846    aspirin EC tablet 81 mg  81 mg Oral Daily Dave Estevez APRN - CNP   81 mg at 05/27/22 1846    atorvastatin (LIPITOR) tablet 40 mg  40 mg Oral Daily Dave Estevez APRN - CNP   40 mg at 05/27/22 1846    sodium chloride flush 0.9 % injection 5-40 mL  5-40 mL IntraVENous 2 times per day Dave Estevez APRN - CNP   10 mL at 05/28/22 2057    sodium chloride flush 0.9 % injection 5-40 mL  5-40 mL IntraVENous PRN Dave Estevez APRN - CNP        0.9 % sodium chloride infusion   IntraVENous PRN Dave Estevez APRN - CNP        ondansetron (ZOFRAN-ODT) disintegrating tablet 4 mg  4 mg Oral Q8H PRN Dave Estevez APRN - CNP        Or    ondansetron [facility] injection 4 mg  4 mg IntraVENous Q6H PRN Dave Estevez, APRN - CNP   4 mg at 05/28/22 1005    polyethylene glycol (GLYCOLAX) packet 17 g  17 g Oral Daily PRN Dave Estevez APRN - CNP        acetaminophen (TYLENOL) tablet 650 mg  650 mg Oral Q6H PRN Dave Estevez, APRN - CNP        Or    acetaminophen (TYLENOL) suppository 650 mg  650 mg Rectal Q6H PRN Dave Estevez, APRN - CNP        heparin (porcine) injection 5,000 Units  5,000 Units SubCUTAneous 3 times per day Dave Estevez APRN - CNP   5,000 Units at 05/29/22 7140        No Known Allergies    Review of Systems  Secondary to agitated confusion and delirium no meaningful review of systems cannot be obtained  BP (!) 166/95   Pulse (!) 107   Temp 98.6 °F (37 °C) (Axillary)   Resp 17   Ht 5' 7\" (1.702 m) Wt 178 lb (80.7 kg)   SpO2 100%   BMI 27.88 kg/m²     Neurologic Exam  Agitated and delirious per description with physical therapy and accordingly therefore not appropriate candidate for a telemedicine interview    Most recent MRI  MRI brain was reviewed on the PACS system from 2015 that study demonstrated old encephalomalacia in the left parietal region which is very similar to what is currently seen on his current CT scan additionally that study reflected the presence of an acute right-sided subdural hematoma that spontaneously absorbed and was not evident on the 2020 CT scan nor on the current evaluation  There was additionally a history of a right basal ganglia bleed       Most recent MRA   No results found for this or any previous visit. Most recent CTA  Results for orders placed during the hospital encounter of 05/27/22    CT HEAD WO CONTRAST    Narrative  History: fall, found on floor    Exam: CT head without contrast    Technique: Thin section axial CT images were obtained from the skullbase through  the vertex. Radiation dose reduction techniques were used for this study. Our  CT scanners use one or all of the following: Automated exposure control,  adjustment of the mA and/or kV according to patient size, use of iterative  reconstruction. COMPARISON: 9/9/2020    Findings: There is a stable area of encephalomalacia within the left parietal  lobe, likely related to remote infarct. Chronic appearing white matter change  present in the corona radiata and centrum semiovale. There is generalized  cerebral atrophy with ex vacuo dilation of the ventricles. . No extra-axial fluid  collection is present. There is no mass or mass-effect. The basilar cisterns are  patent. The paranasal sinuses and mastoid air cells are clear. Impression  Stable CT head without contrast. No acute intracranial abnormality. Most recent Echo  No results found for this or any previous visit.          Most recent lipid panels  Lab Results   Component Value Date    CHOL 128 12/08/2021    HDL 44 12/08/2021    VLDL 16 12/08/2021       Most recent Hgb A1C  No results found for: HBA1C, BOU1OWNV      Assessment/Plan:  Patient has far advanced multi infarct state additionally has acute delirium likely on basis acute metabolic encephalopathy and environmental change. MRI scan is canceled- there is based upon serial review of the patient's imaging no meaningful diagnosis or therapeutic pathway that this investigation would give rise to  Symptomatic management of delirium. Replacement of trace elements including thiamine given that there are likely substantial deficiencies in intake in addition to the stated problems with alcohol  Hydration is important particularly with his muscle breakdown and the fact that it appears he has myoglobinuria based upon the finding of positivity of his urine in the hemoglobin spectrum which also reflects myoglobin  Clearly the patient is not capable of living in an independent situation  There is the possibility that the patient may based upon his neuro pathology have a propensity for seizures.   An electroencephalogram would NOT be technically feasible given his degree of cooperation    Usage of Depakote as a mood modulator is suggested and it will also cover the remote possibility of any ictal tendency          Jarret Arndt MD

## 2022-05-29 NOTE — PROGRESS NOTES
Progress Note    Patient: Danica Gordon MRN: [id number]  SSN: [id number]    YOB: 1927  Age: 80 y.o. Sex: male      Admit Date: 5/27/2022    LOS: 2 days     Assessment and Plan:    80 y.o. male with medical history of Alcohol abuse, Dementia, HTN, BPH, CVA, SDH, CAD  who presented after being found on the floor at home by his son    1. Fall  -CT of the brain without acute intracranial abnormalities  -PT and OT evaluation    2. Encephalopathy / Dementia  -CT brain without acute intracranial abnormalities  -Urinalysis without pyuria or bacteriuria  -Obtain chest x-ray  -Currently no signs of infection  -Monitor mental status  -Avoid sedatives  -Delirium precautions  -Started depakote  -MRI brain  -Neurology consult    3. Rhabdomyolysis   -Continue IV fluids for now  -Monitor renal function  -Monitor CK    4. Hypertension  -Continue amlodipine    5. Hypokalemia  -Replete  -Monitor K    6. Possible CAP  -Start ceftriaxone and doxy    DVT prophylaxis with heparin subcu      Subjective:   80 y.o. male with medical history of Alcohol abuse, Dementia, HTN, BPH, CVA, SDH, CAD  who presented after being found on the floor at home by his son. Patient seen and examined at bedside. This morning the patient alert but confused. Denies any chest pain, no shortness of breath, no abdominal pain. Son at bedside. Objective:     Vitals:    05/29/22 0725 05/29/22 0818 05/29/22 1200 05/29/22 1204   BP: (!) 166/91 (!) 166/95 (!) 156/135 (!) 161/109   Pulse: (!) 107  72    Resp: 17  17    Temp: 98.6 °F (37 °C)  97.7 °F (36.5 °C)    TempSrc: Axillary  Axillary    SpO2: 100%  97%    Weight:       Height:            Intake and Output:  Current Shift: No intake/output data recorded.   Last three shifts: 05/27 1901 - 05/29 0700  In: 229 [P.O.:229]  Out: 300 [Urine:300]    ROS  10 ROS negative except from stated on subjective    Physical Exam:   General: Alert, confused, NAD  HEENT: NC/AT, EOM are intact  Neck: supple, no JVD  Cardiovascular: RRR, S1, S2, no murmurs  Respiratory: Lungs are clear, no wheezes or rales  Abdomen: Soft, NT, ND  Back: No CVA tenderness, no paraspinal tenderness  Extremities: LE without pedal edema, no erythema  Neuro: CN are intact, no focal deficits  Skin: no rash or ulcers      Lab/Data Review:  I have personally reviewed patients laboratory data showing  Recent Results (from the past 24 hour(s))   PLEASE READ & DOCUMENT PPD TEST IN 24 HRS    Collection Time: 05/28/22  3:24 PM   Result Value Ref Range    PPD, (POC) Negative Negative    mm Induration 0 0 - 5 mm   CBC with Auto Differential    Collection Time: 05/29/22  5:40 AM   Result Value Ref Range    WBC 5.5 4.3 - 11.1 K/uL    RBC 4.35 4.23 - 5.6 M/uL    Hemoglobin 12.9 (L) 13.6 - 17.2 g/dL    Hematocrit 40.5 (L) 41.1 - 50.3 %    MCV 93.1 79.6 - 97.8 FL    MCH 29.7 26.1 - 32.9 PG    MCHC 31.9 31.4 - 35.0 g/dL    RDW 14.7 (H) 11.9 - 14.6 %    Platelets 75 (L) 428 - 450 K/uL    MPV 10.3 9.4 - 12.3 FL    nRBC 0.00 0.0 - 0.2 K/uL    Differential Type AUTOMATED      Seg Neutrophils 79 (H) 43 - 78 %    Lymphocytes 14 13 - 44 %    Monocytes 6 4.0 - 12.0 %    Eosinophils % 0 (L) 0.5 - 7.8 %    Basophils 1 0.0 - 2.0 %    Immature Granulocytes 0 0.0 - 5.0 %    Segs Absolute 4.3 1.7 - 8.2 K/UL    Absolute Lymph # 0.8 0.5 - 4.6 K/UL    Absolute Mono # 0.3 0.1 - 1.3 K/UL    Absolute Eos # 0.0 0.0 - 0.8 K/UL    Basophils Absolute 0.0 0.0 - 0.2 K/UL    Absolute Immature Granulocyte 0.0 0.0 - 0.5 K/UL   Basic Metabolic Panel    Collection Time: 05/29/22  5:40 AM   Result Value Ref Range    Sodium 142 136 - 145 mmol/L    Potassium 3.7 3.5 - 5.1 mmol/L    Chloride 106 98 - 107 mmol/L    CO2 25 21 - 32 mmol/L    Anion Gap 11 7 - 16 mmol/L    Glucose 120 (H) 65 - 100 mg/dL    BUN 11 8 - 23 MG/DL    CREATININE 1.09 0.8 - 1.5 MG/DL    GFR African American >60 >60 ml/min/1.73m2    GFR Non- >60 >60 ml/min/1.73m2    Calcium 9.4 8.3 - 10.4 MG/DL   CK Collection Time: 05/29/22  5:40 AM   Result Value Ref Range    Total CK 1,773 (H) 21 - 215 U/L      [unfilled]     Image:  I have personally reviewed patients imaging showing  MRI BRAIN WO CONTRAST   Final Result   1. No evidence of acute hemorrhage or infarct. 2. Chronic white matter disease, most likely small vessel microvascular   ischemia. 3. Generalized ventricular dilatation has slightly increased since prior and is   likely commensurate with age related atrophy (unless there is high clinical   suspicion for normal pressure hydrocephalus). 4. Stable small benign right basal ganglia cavernoma. XR CHEST 1 VIEW   Final Result   Cardiomegaly with interstitial pulmonary edema pattern. Atypical infection is   difficult to exclude. CT HEAD WO CONTRAST   Final Result   Stable CT head without contrast. No acute intracranial abnormality. Hospital problems     Patient Active Problem List   Diagnosis    Essential hypertension with goal blood pressure less than 140/90    Primary osteoarthritis of both knees    Aortic insufficiency    BPH associated with nocturia    Coronary artery disease involving native heart without angina pectoris    Dementia associated with alcoholism with behavioral disturbance ([address]    Falls frequently    ETOH abuse    Hemiparesis affecting dominant side as late effect of cerebrovascular accident ([address]    Dyslipidemia    Benign prostatic hyperplasia with incomplete bladder emptying    Compression fracture of L2 vertebra with routine healing    Primary open angle glaucoma (POAG) of both eyes    Rhabdomyolysis        I have reviewed, updated, and verified this note's content and spent 36 minutes of my 40 minutes visit performing counseling and coordination of care regarding medical management.        Signed By: Jose Salgado MD     May 29, 2022

## 2022-05-30 LAB
AMMONIA PLAS-SCNC: <10 UMOL/L (ref 24.9–68)
ANION GAP SERPL CALC-SCNC: 9 MMOL/L (ref 7–16)
BASOPHILS # BLD: 0 K/UL (ref 0–0.2)
BASOPHILS NFR BLD: 0 % (ref 0–2)
BUN SERPL-MCNC: 15 MG/DL (ref 8–23)
CALCIUM SERPL-MCNC: 9 MG/DL (ref 8.3–10.4)
CHLORIDE SERPL-SCNC: 106 MMOL/L (ref 98–107)
CK SERPL-CCNC: 554 U/L (ref 21–215)
CO2 SERPL-SCNC: 25 MMOL/L (ref 21–32)
CREAT SERPL-MCNC: 0.87 MG/DL (ref 0.8–1.5)
DIFFERENTIAL METHOD BLD: ABNORMAL
EOSINOPHIL # BLD: 0.1 K/UL (ref 0–0.8)
EOSINOPHIL NFR BLD: 2 % (ref 0.5–7.8)
ERYTHROCYTE [DISTWIDTH] IN BLOOD BY AUTOMATED COUNT: 15.1 % (ref 11.9–14.6)
GLUCOSE SERPL-MCNC: 110 MG/DL (ref 65–100)
HCT VFR BLD AUTO: 43 % (ref 41.1–50.3)
HGB BLD-MCNC: 14 G/DL (ref 13.6–17.2)
IMM GRANULOCYTES # BLD AUTO: 0 K/UL (ref 0–0.5)
IMM GRANULOCYTES NFR BLD AUTO: 0 % (ref 0–5)
LYMPHOCYTES # BLD: 0.7 K/UL (ref 0.5–4.6)
LYMPHOCYTES NFR BLD: 14 % (ref 13–44)
MCH RBC QN AUTO: 30.4 PG (ref 26.1–32.9)
MCHC RBC AUTO-ENTMCNC: 32.6 G/DL (ref 31.4–35)
MCV RBC AUTO: 93.5 FL (ref 79.6–97.8)
MM INDURATION, POC: 0 MM (ref 0–5)
MONOCYTES # BLD: 0.3 K/UL (ref 0.1–1.3)
MONOCYTES NFR BLD: 5 % (ref 4–12)
NEUTS SEG # BLD: 4.3 K/UL (ref 1.7–8.2)
NEUTS SEG NFR BLD: 79 % (ref 43–78)
NRBC # BLD: 0 K/UL (ref 0–0.2)
PLATELET # BLD AUTO: 114 K/UL (ref 150–450)
PMV BLD AUTO: 12.2 FL (ref 9.4–12.3)
POTASSIUM SERPL-SCNC: 3.6 MMOL/L (ref 3.5–5.1)
PPD, POC: NEGATIVE
RBC # BLD AUTO: 4.6 M/UL (ref 4.23–5.6)
SODIUM SERPL-SCNC: 140 MMOL/L (ref 136–145)
VIT B12 SERPL-MCNC: 195 PG/ML (ref 193–986)
WBC # BLD AUTO: 5.4 K/UL (ref 4.3–11.1)

## 2022-05-30 PROCEDURE — 82607 VITAMIN B-12: CPT

## 2022-05-30 PROCEDURE — 36415 COLL VENOUS BLD VENIPUNCTURE: CPT

## 2022-05-30 PROCEDURE — 2580000003 HC RX 258: Performed by: NURSE PRACTITIONER

## 2022-05-30 PROCEDURE — 1100000000 HC RM PRIVATE

## 2022-05-30 PROCEDURE — 6360000002 HC RX W HCPCS: Performed by: PSYCHIATRY & NEUROLOGY

## 2022-05-30 PROCEDURE — 6360000002 HC RX W HCPCS: Performed by: NURSE PRACTITIONER

## 2022-05-30 PROCEDURE — 80048 BASIC METABOLIC PNL TOTAL CA: CPT

## 2022-05-30 PROCEDURE — 6370000000 HC RX 637 (ALT 250 FOR IP): Performed by: INTERNAL MEDICINE

## 2022-05-30 PROCEDURE — 82550 ASSAY OF CK (CPK): CPT

## 2022-05-30 PROCEDURE — 85025 COMPLETE CBC W/AUTO DIFF WBC: CPT

## 2022-05-30 PROCEDURE — 6370000000 HC RX 637 (ALT 250 FOR IP): Performed by: NURSE PRACTITIONER

## 2022-05-30 PROCEDURE — 82140 ASSAY OF AMMONIA: CPT

## 2022-05-30 PROCEDURE — 2500000003 HC RX 250 WO HCPCS: Performed by: INTERNAL MEDICINE

## 2022-05-30 PROCEDURE — 6360000002 HC RX W HCPCS: Performed by: INTERNAL MEDICINE

## 2022-05-30 PROCEDURE — 2580000003 HC RX 258: Performed by: INTERNAL MEDICINE

## 2022-05-30 RX ADMIN — HEPARIN SODIUM 5000 UNITS: 5000 INJECTION INTRAVENOUS; SUBCUTANEOUS at 21:55

## 2022-05-30 RX ADMIN — HEPARIN SODIUM 5000 UNITS: 5000 INJECTION INTRAVENOUS; SUBCUTANEOUS at 05:56

## 2022-05-30 RX ADMIN — ATORVASTATIN CALCIUM 40 MG: 40 TABLET, FILM COATED ORAL at 09:19

## 2022-05-30 RX ADMIN — DIVALPROEX SODIUM 125 MG: 125 CAPSULE ORAL at 14:41

## 2022-05-30 RX ADMIN — ASPIRIN 81 MG: 81 TABLET, COATED ORAL at 09:19

## 2022-05-30 RX ADMIN — AMLODIPINE BESYLATE 5 MG: 5 TABLET ORAL at 09:19

## 2022-05-30 RX ADMIN — DOXYCYCLINE 100 MG: 100 INJECTION, POWDER, LYOPHILIZED, FOR SOLUTION INTRAVENOUS at 09:19

## 2022-05-30 RX ADMIN — DIVALPROEX SODIUM 125 MG: 125 CAPSULE ORAL at 21:55

## 2022-05-30 RX ADMIN — SODIUM CHLORIDE, PRESERVATIVE FREE 10 ML: 5 INJECTION INTRAVENOUS at 21:55

## 2022-05-30 RX ADMIN — THIAMINE HYDROCHLORIDE 200 MG: 100 INJECTION, SOLUTION INTRAMUSCULAR; INTRAVENOUS at 09:27

## 2022-05-30 RX ADMIN — CEFTRIAXONE 1000 MG: 1 INJECTION, POWDER, FOR SOLUTION INTRAMUSCULAR; INTRAVENOUS at 09:19

## 2022-05-30 RX ADMIN — TAMSULOSIN HYDROCHLORIDE 0.4 MG: 0.4 CAPSULE ORAL at 09:19

## 2022-05-30 RX ADMIN — HEPARIN SODIUM 5000 UNITS: 5000 INJECTION INTRAVENOUS; SUBCUTANEOUS at 14:40

## 2022-05-30 RX ADMIN — DOXYCYCLINE 100 MG: 100 INJECTION, POWDER, LYOPHILIZED, FOR SOLUTION INTRAVENOUS at 21:55

## 2022-05-30 RX ADMIN — DIVALPROEX SODIUM 125 MG: 125 CAPSULE ORAL at 05:57

## 2022-05-30 ASSESSMENT — PAIN SCALES - PAIN ASSESSMENT IN ADVANCED DEMENTIA (PAINAD)
BODYLANGUAGE: 0
NEGVOCALIZATION: 0
CONSOLABILITY: 0
BREATHING: 0
FACIALEXPRESSION: 0
TOTALSCORE: 0

## 2022-05-30 NOTE — PROGRESS NOTES
Occupational Therapy Note:    Attempted to see patient this AM for occupational therapy evaluation session. Patient consistently reporting that he is comfortable and would like to rest at this time. OT and PT educated patient on benefits of OOB activity and encouraged patient's participation; however, patient continued to refuse. He remained calm and pleasantly confused during this attempt. Will follow and re-attempt as schedule permits/patient available.      Thank you,    Wayne Thompson, OT    [facility]

## 2022-05-30 NOTE — PROGRESS NOTES
Physical Therapy Note:    Attempted to see patient this AM for physical therapy evaluation session. Patient has previously been agitated and combative but is calm this morning. He remains confused and is still in restraints. PT/OT spent 10 minutes with patient educating him on the importance of mobility and participating with therapy. He repeatedly and firmly refused. Will follow and re-attempt as schedule permits/patient available.  Thank you,    Patrica Galloway, PT     [facility]

## 2022-05-30 NOTE — PROGRESS NOTES
Progress Note    Patient: Kal Church MRN: [id number]  SSN: [id number]    YOB: 1927  Age: 80 y.o. Sex: male      Admit Date: 5/27/2022    LOS: 3 days     Assessment and Plan:    80 y.o. male with medical history of Alcohol abuse, Dementia, HTN, BPH, CVA, SDH, CAD  who presented after being found on the floor at home by his son    1. Fall  -CT of the brain without acute intracranial abnormalities  -PT and OT evaluation    2. Encephalopathy / Dementia  -CT brain without acute intracranial abnormalities  -Urinalysis without pyuria or bacteriuria  -Monitor mental status  -Avoid sedatives  -Delirium precautions  -Continue depakote  -MRI brain without acute abnormalities but with chronic white matter disease and generalized ventricular dilatation  -Ammonia within normal levels  -Started on IV thiamine  -Neurology  recs    3. Rhabdomyolysis   -Continue IV fluids for now  -Monitor renal function  -Monitor CK    4. Hypertension  -Continue amlodipine    5. Hypokalemia  -Replete  -Monitor K    6. Possible CAP  -Continue ceftriaxone and doxy    DVT prophylaxis with heparin subcu      Subjective:   80 y.o. male with medical history of Alcohol abuse, Dementia, HTN, BPH, CVA, SDH, CAD  who presented after being found on the floor at home by his son. Patient seen and examined at bedside. This morning the patient alert but confused. Denies any chest pain, no shortness of breath, no abdominal pain. Son at bedside. Objective:     Vitals:    05/30/22 0110 05/30/22 0441 05/30/22 0724 05/30/22 1217   BP: (!) 160/79 (!) 160/92 (!) 146/98 135/68   Pulse: 69 69 88 87   Resp: 17 18 18 16   Temp: 97.7 °F (36.5 °C) 98.1 °F (36.7 °C) 98.1 °F (36.7 °C) 98.6 °F (37 °C)   TempSrc: Axillary Axillary Oral Axillary   SpO2: 97% 92% 96% 97%   Weight:       Height:            Intake and Output:  Current Shift: No intake/output data recorded. Last three shifts: No intake/output data recorded.     ROS  10 ROS negative except from stated on subjective    Physical Exam:   General: Alert, confused, NAD  HEENT: NC/AT, EOM are intact  Neck: supple, no JVD  Cardiovascular: RRR, S1, S2, no murmurs  Respiratory: Lungs are clear, no wheezes or rales  Abdomen: Soft, NT, ND  Back: No CVA tenderness, no paraspinal tenderness  Extremities: LE without pedal edema, no erythema  Neuro: CN are intact, no focal deficits  Skin: no rash or ulcers      Lab/Data Review:  I have personally reviewed patients laboratory data showing  Recent Results (from the past 24 hour(s))   CBC with Auto Differential    Collection Time: 05/30/22  6:16 AM   Result Value Ref Range    WBC 5.4 4.3 - 11.1 K/uL    RBC 4.60 4.23 - 5.6 M/uL    Hemoglobin 14.0 13.6 - 17.2 g/dL    Hematocrit 43.0 41.1 - 50.3 %    MCV 93.5 79.6 - 97.8 FL    MCH 30.4 26.1 - 32.9 PG    MCHC 32.6 31.4 - 35.0 g/dL    RDW 15.1 (H) 11.9 - 14.6 %    Platelets 075 (L) 573 - 450 K/uL    MPV 12.2 9.4 - 12.3 FL    nRBC 0.00 0.0 - 0.2 K/uL    Differential Type AUTOMATED      Seg Neutrophils 79 (H) 43 - 78 %    Lymphocytes 14 13 - 44 %    Monocytes 5 4.0 - 12.0 %    Eosinophils % 2 0.5 - 7.8 %    Basophils 0 0.0 - 2.0 %    Immature Granulocytes 0 0.0 - 5.0 %    Segs Absolute 4.3 1.7 - 8.2 K/UL    Absolute Lymph # 0.7 0.5 - 4.6 K/UL    Absolute Mono # 0.3 0.1 - 1.3 K/UL    Absolute Eos # 0.1 0.0 - 0.8 K/UL    Basophils Absolute 0.0 0.0 - 0.2 K/UL    Absolute Immature Granulocyte 0.0 0.0 - 0.5 K/UL   Basic Metabolic Panel    Collection Time: 05/30/22  6:16 AM   Result Value Ref Range    Sodium 140 136 - 145 mmol/L    Potassium 3.6 3.5 - 5.1 mmol/L    Chloride 106 98 - 107 mmol/L    CO2 25 21 - 32 mmol/L    Anion Gap 9 7 - 16 mmol/L    Glucose 110 (H) 65 - 100 mg/dL    BUN 15 8 - 23 MG/DL    CREATININE 0.87 0.8 - 1.5 MG/DL    GFR African American >60 >60 ml/min/1.73m2    GFR Non- >60 >60 ml/min/1.73m2    Calcium 9.0 8.3 - 10.4 MG/DL   CK    Collection Time: 05/30/22  6:16 AM   Result Value Ref Range    Total  (H) 21 - 215 U/L   Ammonia    Collection Time: 05/30/22  8:01 AM   Result Value Ref Range    Ammonia <10 (L) 24.9 - 68 UMOL/L      [unfilled]     Image:  I have personally reviewed patients imaging showing  MRI BRAIN WO CONTRAST   Final Result   1. No evidence of acute hemorrhage or infarct. 2. Chronic white matter disease, most likely small vessel microvascular   ischemia. 3. Generalized ventricular dilatation has slightly increased since prior and is   likely commensurate with age related atrophy (unless there is high clinical   suspicion for normal pressure hydrocephalus). 4. Stable small benign right basal ganglia cavernoma. XR CHEST 1 VIEW   Final Result   Cardiomegaly with interstitial pulmonary edema pattern. Atypical infection is   difficult to exclude. CT HEAD WO CONTRAST   Final Result   Stable CT head without contrast. No acute intracranial abnormality.                  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

## 2022-05-30 NOTE — CARE COORDINATION
Interdisciplinary team meeting with Dr. Kamala Mir, CM, and nutritional services Patient is medically stable for d/c however is in restraints and not participating in PT/OT so unable to send referrals to SNF to review. Family is interested in [address] when he is able to participate. Patient wife is also a patient on orthopedics. CM continues to follow.

## 2022-05-31 LAB
ANION GAP SERPL CALC-SCNC: 8 MMOL/L (ref 7–16)
BASOPHILS # BLD: 0 K/UL (ref 0–0.2)
BASOPHILS NFR BLD: 0 % (ref 0–2)
BUN SERPL-MCNC: 19 MG/DL (ref 8–23)
CALCIUM SERPL-MCNC: 8.5 MG/DL (ref 8.3–10.4)
CHLORIDE SERPL-SCNC: 106 MMOL/L (ref 98–107)
CO2 SERPL-SCNC: 26 MMOL/L (ref 21–32)
CREAT SERPL-MCNC: 1.04 MG/DL (ref 0.8–1.5)
DIFFERENTIAL METHOD BLD: ABNORMAL
EOSINOPHIL # BLD: 0.1 K/UL (ref 0–0.8)
EOSINOPHIL NFR BLD: 1 % (ref 0.5–7.8)
ERYTHROCYTE [DISTWIDTH] IN BLOOD BY AUTOMATED COUNT: 15.7 % (ref 11.9–14.6)
GLUCOSE SERPL-MCNC: 140 MG/DL (ref 65–100)
HCT VFR BLD AUTO: 41.1 % (ref 41.1–50.3)
HGB BLD-MCNC: 12.7 G/DL (ref 13.6–17.2)
IMM GRANULOCYTES # BLD AUTO: 0 K/UL (ref 0–0.5)
IMM GRANULOCYTES NFR BLD AUTO: 1 % (ref 0–5)
LYMPHOCYTES # BLD: 0.6 K/UL (ref 0.5–4.6)
LYMPHOCYTES NFR BLD: 12 % (ref 13–44)
MCH RBC QN AUTO: 30.3 PG (ref 26.1–32.9)
MCHC RBC AUTO-ENTMCNC: 30.9 G/DL (ref 31.4–35)
MCV RBC AUTO: 98.1 FL (ref 79.6–97.8)
MONOCYTES # BLD: 0.3 K/UL (ref 0.1–1.3)
MONOCYTES NFR BLD: 7 % (ref 4–12)
NEUTS SEG # BLD: 3.8 K/UL (ref 1.7–8.2)
NEUTS SEG NFR BLD: 79 % (ref 43–78)
NRBC # BLD: 0 K/UL (ref 0–0.2)
PLATELET # BLD AUTO: 59 K/UL (ref 150–450)
PMV BLD AUTO: 10.1 FL (ref 9.4–12.3)
POTASSIUM SERPL-SCNC: 3.3 MMOL/L (ref 3.5–5.1)
RBC # BLD AUTO: 4.19 M/UL (ref 4.23–5.6)
SODIUM SERPL-SCNC: 140 MMOL/L (ref 136–145)
WBC # BLD AUTO: 4.9 K/UL (ref 4.3–11.1)

## 2022-05-31 PROCEDURE — 85025 COMPLETE CBC W/AUTO DIFF WBC: CPT

## 2022-05-31 PROCEDURE — 6360000002 HC RX W HCPCS: Performed by: NURSE PRACTITIONER

## 2022-05-31 PROCEDURE — 6360000002 HC RX W HCPCS: Performed by: INTERNAL MEDICINE

## 2022-05-31 PROCEDURE — 80048 BASIC METABOLIC PNL TOTAL CA: CPT

## 2022-05-31 PROCEDURE — 2500000003 HC RX 250 WO HCPCS: Performed by: INTERNAL MEDICINE

## 2022-05-31 PROCEDURE — 97535 SELF CARE MNGMENT TRAINING: CPT

## 2022-05-31 PROCEDURE — 1100000000 HC RM PRIVATE

## 2022-05-31 PROCEDURE — 6360000002 HC RX W HCPCS: Performed by: PSYCHIATRY & NEUROLOGY

## 2022-05-31 PROCEDURE — 97530 THERAPEUTIC ACTIVITIES: CPT

## 2022-05-31 PROCEDURE — 6370000000 HC RX 637 (ALT 250 FOR IP): Performed by: NURSE PRACTITIONER

## 2022-05-31 PROCEDURE — 2580000003 HC RX 258: Performed by: NURSE PRACTITIONER

## 2022-05-31 PROCEDURE — 2580000003 HC RX 258: Performed by: INTERNAL MEDICINE

## 2022-05-31 PROCEDURE — 6370000000 HC RX 637 (ALT 250 FOR IP): Performed by: INTERNAL MEDICINE

## 2022-05-31 PROCEDURE — 2580000003 HC RX 258: Performed by: PSYCHIATRY & NEUROLOGY

## 2022-05-31 PROCEDURE — 97166 OT EVAL MOD COMPLEX 45 MIN: CPT

## 2022-05-31 PROCEDURE — 97161 PT EVAL LOW COMPLEX 20 MIN: CPT

## 2022-05-31 PROCEDURE — 36415 COLL VENOUS BLD VENIPUNCTURE: CPT

## 2022-05-31 RX ADMIN — ASPIRIN 81 MG: 81 TABLET, COATED ORAL at 08:24

## 2022-05-31 RX ADMIN — DOXYCYCLINE 100 MG: 100 INJECTION, POWDER, LYOPHILIZED, FOR SOLUTION INTRAVENOUS at 10:40

## 2022-05-31 RX ADMIN — THIAMINE HYDROCHLORIDE 200 MG: 100 INJECTION, SOLUTION INTRAMUSCULAR; INTRAVENOUS at 14:30

## 2022-05-31 RX ADMIN — CEFTRIAXONE 1000 MG: 1 INJECTION, POWDER, FOR SOLUTION INTRAMUSCULAR; INTRAVENOUS at 10:40

## 2022-05-31 RX ADMIN — ATORVASTATIN CALCIUM 40 MG: 40 TABLET, FILM COATED ORAL at 08:24

## 2022-05-31 RX ADMIN — AMLODIPINE BESYLATE 5 MG: 5 TABLET ORAL at 08:24

## 2022-05-31 RX ADMIN — SODIUM CHLORIDE, PRESERVATIVE FREE 10 ML: 5 INJECTION INTRAVENOUS at 08:24

## 2022-05-31 RX ADMIN — TAMSULOSIN HYDROCHLORIDE 0.4 MG: 0.4 CAPSULE ORAL at 08:24

## 2022-05-31 RX ADMIN — HEPARIN SODIUM 5000 UNITS: 5000 INJECTION INTRAVENOUS; SUBCUTANEOUS at 05:16

## 2022-05-31 RX ADMIN — SODIUM CHLORIDE, PRESERVATIVE FREE 10 ML: 5 INJECTION INTRAVENOUS at 21:08

## 2022-05-31 RX ADMIN — DOXYCYCLINE 100 MG: 100 INJECTION, POWDER, LYOPHILIZED, FOR SOLUTION INTRAVENOUS at 21:08

## 2022-05-31 RX ADMIN — POTASSIUM CHLORIDE 10 MEQ: 10 TABLET, EXTENDED RELEASE ORAL at 08:24

## 2022-05-31 RX ADMIN — DIVALPROEX SODIUM 125 MG: 125 CAPSULE ORAL at 14:46

## 2022-05-31 RX ADMIN — SODIUM CHLORIDE, SODIUM LACTATE, POTASSIUM CHLORIDE, AND CALCIUM CHLORIDE: 600; 310; 30; 20 INJECTION, SOLUTION INTRAVENOUS at 04:07

## 2022-05-31 RX ADMIN — DIVALPROEX SODIUM 125 MG: 125 CAPSULE ORAL at 05:16

## 2022-05-31 RX ADMIN — DIVALPROEX SODIUM 125 MG: 125 CAPSULE ORAL at 21:08

## 2022-05-31 ASSESSMENT — PAIN SCALES - PAIN ASSESSMENT IN ADVANCED DEMENTIA (PAINAD)
CONSOLABILITY: 1
BREATHING: 0
BODYLANGUAGE: 1
NEGVOCALIZATION: 1
TOTALSCORE: 4
FACIALEXPRESSION: 1

## 2022-05-31 NOTE — PROGRESS NOTES
PHYSICAL THERAPY Initial Assessment and AM  (Link to Caseload Tracking: PT Visit Days : 1  Acknowledge Orders  Time In/Out  PT Charge Capture  Rehab Caseload Tracker    Pancho Pyle is a 80 y.o. male   PRIMARY DIAGNOSIS: Rhabdomyolysis  Rhabdomyolysis [M62.82]  Fall in home, initial encounter [W19. XXXA, B13.933]  Dementia without behavioral disturbance, unspecified dementia type ([facility] [address] [F03.90]  Unable to care for self [Z78.9]       Reason for Referral: Difficulty in walking, Not elsewhere classified (R26.2)  Inpatient: Payor: Mary Ore / Plan: MEDICARE PART A AND B / Product Type: *No Product type* /     ASSESSMENT:     REHAB RECOMMENDATIONS:   Recommendation to date pending progress:  Setting:   Short-term Rehab    Equipment:     To Be Determined     ASSESSMENT:  Mr. Debora Mendiola presents with generalized weakness and decreased independence with functional mobility. He will benefit from skilled PT interventions to maximize independence with functional mobility and strengthening. Assessment had been previously attempted but pt was unable to participate and had been on hold per RN. Pt doing better today and asking to get up. Saw pt with OT today. Worked on bed mobility, sit to stand and taking a few steps at bedside all with assist of 2. Pt overall weak and would be unsafe to be up without assist. Spent some time in sitting at bedside and has good static balance in sitting. Pt returned supine with assist of 2. Hope to progress at next therapy session.  At this time recommend SNF on discharge from [address] [place] [address] Mobility Inpatient   How much difficulty turning over in bed?: A Lot  How much difficulty sitting down on / standing up from a chair with arms?: A Lot  How much difficulty moving from lying on back to sitting on side of bed?: A Lot  How much help from another person moving to and from a bed to a chair?: A Lot  How much help from another person needed to walk in hospital room?: A Lot  How much help from another person for climbing 3-5 steps with a railing?: Total  AM-PAC Inpatient Mobility Raw Score : 11  AM-PAC Inpatient T-Scale Score : 33.86  Mobility Inpatient CMS 0-100% Score: 72.57  Mobility Inpatient CMS G-Code Modifier : CL    SUBJECTIVE:   Mr. Sinclair Hamman states, \"OK\"     Social/Functional Lives With: Spouse  Type of Home: House  Receives Help From: Family  Occupation: Retired    OBJECTIVE:     PAIN: Spearman Cleaves / O2: Price Babinski / Mikaela Fregoso / Levar Book:   Pre Treatment:   Pain Assessment:  (no reports of pain at rest)      Post Treatment: some left shoulder pain when returning supine, no reports of pain when settled Vitals        Oxygen      IV    RESTRICTIONS/PRECAUTIONS:  Restrictions/Precautions: Fall Risk  Required Braces or Orthoses?: No                 GROSS EVALUATION: Intact Impaired (Comments):   AROM []  generally decreased, left UE painful   PROM []    Strength []  generally decreased bilateral lower extremities   Balance [] Posture: Fair  Sitting - Static: Good  Sitting - Dynamic: Poor  Standing - Static: Poor  Standing - Dynamic: Poor   Posture [] Forward Head  Rounded Shoulders  Thoracic Kyphosis   Sensation []     Coordination []   generally decreased   Tone [x]     Edema [x]    Activity Tolerance [] Patient Tolerated treatment well,Patient limited by fatigue    []      COGNITION/  PERCEPTION: Intact Impaired (Comments):   Orientation []  pt with dementia   Vision [x]     Hearing [x]     Cognition  []       MOBILITY: I Mod I S SBA CGA Min Mod Max Total  NT x2 Comments:   Bed Mobility    Rolling [] [] [] [] [] [] [] [] [] [] []    Supine to Sit [] [] [] [] [] [] [x] [] [] [] [x]    Scooting [] [] [] [] [x] [] [] [] [] [] []    Sit to Supine [] [] [] [] [] [] [x] [] [] [] [x]    Transfers    Sit to Stand [] [] [] [] [] [] [x] [] [] [] [x]    Bed to Chair [] [] [] [] [] [] [] [] [] [] []    Stand to Sit [] [] [] [] [] [x] [] [] [] [] [x]    I=Independent, Mod I=Modified Independent, S=Supervision, SBA=Standby Assistance, CGA=Contact Guard Assistance,   Min=Minimal Assistance, Mod=Moderate Assistance, Max=Maximal Assistance, Total=Total Assistance, NT=Not Tested    GAIT: I Mod I S SBA CGA Min Mod Max Total  NT x2 Comments:   Level of Assistance [] [] [] [] [] [] [x] [] [] [] [x]    Distance   only a few steps at bedside    DME hand held assist    Gait Quality Decreased celine , Decreased step clearance and Narrow base of support    Weightbearing Status Restrictions/Precautions  Restrictions/Precautions: Fall Risk  Required Braces or Orthoses?: No    Stairs      I=Independent, Mod I=Modified Independent, S=Supervision, SBA=Standby Assistance, CGA=Contact Guard Assistance,   Min=Minimal Assistance, Mod=Moderate Assistance, Max=Maximal Assistance, Total=Total Assistance, NT=Not Tested    PLAN:   ACUTE PHYSICAL THERAPY GOALS:   (Developed with and agreed upon by patient and/or caregiver.)  STG:  (1.)Mr. Carlos Quinn will move from supine to sit and sit to supine  with MODERATE ASSIST within 4-7 treatment day(s). (2.)Mr. Carlos Quinn will transfer from bed to chair and chair to bed with MODERATE ASSIST using the least restrictive device within 4-7 treatment day(s). (3.)Mr. Carlos Quinn will ambulate with MINIMAL ASSIST of 2 for 50 feet with the least restrictive device within 4-7 treatment day(s).      ____________________________________________________________________________    FREQUENCY AND DURATION: Daily for duration of hospital stay or until stated goals are met, whichever comes first.    THERAPY PROGNOSIS: Good    PROBLEM LIST:   (Skilled intervention is medically necessary to address:)  Decreased ADL/Functional Activities  Decreased Activity Tolerance  Decreased Balance  Decreased Coordination  Decreased Gait Ability  Decreased Safety Awareness  Decreased Strength  Decreased Transfer Abilities INTERVENTIONS PLANNED:   (Benefits and precautions of physical therapy have been discussed with the patient.)  Therapeutic Activity  Therapeutic Exercise/HEP  Gait Training       TREATMENT:   EVALUATION: LOW COMPLEXITY: (Untimed Charge)    TREATMENT:   Co-Treatment PT/OT necessary due to patient's decreased overall endurance/tolerance levels, as well as need for high level skilled assistance to complete functional transfers/mobility and functional tasks  Therapeutic Activity (15 Minutes): Therapeutic activity included Supine to Sit, Sit to Supine, Scooting, Ambulation on level ground, Sitting balance  and Standing balance to improve functional Activity tolerance, Balance, Coordination, Mobility and Strength.     TREATMENT GRID:  N/A    AFTER TREATMENT PRECAUTIONS: Bed and Needs within reach    INTERDISCIPLINARY COLLABORATION:  RN/ PCT, OT/ VILLAR and RN Case Manager/      EDUCATION: Education Given To: Patient  Education Provided: Role of Therapy;Plan of Care  Barriers to Learning: Cognition  Education Outcome: Other (Comment) (needs reinforcement)    TIME IN/OUT:  Time In: 7797  Time Out: 0900  Minutes: [facility], PT

## 2022-05-31 NOTE — PROGRESS NOTES
Progress Note    Patient: Linda Pappas MRN: [id number]  SSN: [id number]    YOB: 1927  Age: 80 y.o. Sex: male      Admit Date: 5/27/2022    LOS: 4 days     Assessment and Plan:    80 y.o. male with medical history of Alcohol abuse, Dementia, HTN, BPH, CVA, SDH, CAD  who presented after being found on the floor at home by his son    1. Fall  -CT of the brain without acute intracranial abnormalities  -PT and OT recommendations appreciated    2. Encephalopathy / Dementia  -CT brain without acute intracranial abnormalities  -Urinalysis without pyuria or bacteriuria  -Monitor mental status  -Avoid sedatives  -Delirium precautions  -Continue depakote  -MRI brain without acute abnormalities but with chronic white matter disease and generalized ventricular dilatation  -Ammonia within normal levels  -Started on IV thiamine  -Neurology  recs    3. Rhabdomyolysis  -Continue IV fluids for now  -Monitor renal function  -Monitor CK    4. Hypertension  -Continue amlodipine    5. Hypokalemia  -Replete  -Monitor K    6. Possible CAP  -Continue ceftriaxone and doxy    DVT prophylaxis with heparin subcu      Subjective:   80 y.o. male with medical history of Alcohol abuse, Dementia, HTN, BPH, CVA, SDH, CAD  who presented after being found on the floor at home by his son. Patient seen and examined at bedside. This morning the patient somnolent but arousable. Denies any chest pain, no shortness of breath, no abdominal pain. Son at bedside. Objective:     Vitals:    05/30/22 1926 05/31/22 0351 05/31/22 0710 05/31/22 1042   BP: 124/83 (!) 177/91 (!) 176/93 138/62   Pulse: 76 79 84 89   Resp: 16 17 17 18   Temp: 98.2 °F (36.8 °C) 98.2 °F (36.8 °C) 98.2 °F (36.8 °C) 98.1 °F (36.7 °C)   TempSrc: Axillary Oral Oral Oral   SpO2: 94% 97% 98% 96%   Weight:       Height:            Intake and Output:  Current Shift: No intake/output data recorded.   Last three shifts: 05/29 1901 - 05/31 0700  In: 480 [P.O.:480]  Out: -     ROS  10 ROS negative except from stated on subjective    Physical Exam:   General: Somnolent, confused, NAD  HEENT: NC/AT, EOM are intact  Neck: supple, no JVD  Cardiovascular: RRR, S1, S2, no murmurs  Respiratory: Lungs are clear, no wheezes or rales  Abdomen: Soft, NT, ND  Back: No CVA tenderness, no paraspinal tenderness  Extremities: LE without pedal edema, no erythema  Neuro: CN are intact, no focal deficits  Skin: no rash or ulcers      Lab/Data Review:  I have personally reviewed patients laboratory data showing  Recent Results (from the past 24 hour(s))   PLEASE READ & DOCUMENT PPD TEST IN 72 HRS    Collection Time: 05/30/22  2:26 PM   Result Value Ref Range    PPD, (POC) Negative Negative    mm Induration 0 0 - 5 mm   CBC with Auto Differential    Collection Time: 05/31/22  6:00 AM   Result Value Ref Range    WBC 4.9 4.3 - 11.1 K/uL    RBC 4.19 (L) 4.23 - 5.6 M/uL    Hemoglobin 12.7 (L) 13.6 - 17.2 g/dL    Hematocrit 41.1 41.1 - 50.3 %    MCV 98.1 (H) 79.6 - 97.8 FL    MCH 30.3 26.1 - 32.9 PG    MCHC 30.9 (L) 31.4 - 35.0 g/dL    RDW 15.7 (H) 11.9 - 14.6 %    Platelets 59 (L) 165 - 450 K/uL    MPV 10.1 9.4 - 12.3 FL    nRBC 0.00 0.0 - 0.2 K/uL    Differential Type AUTOMATED      Seg Neutrophils 79 (H) 43 - 78 %    Lymphocytes 12 (L) 13 - 44 %    Monocytes 7 4.0 - 12.0 %    Eosinophils % 1 0.5 - 7.8 %    Basophils 0 0.0 - 2.0 %    Immature Granulocytes 1 0.0 - 5.0 %    Segs Absolute 3.8 1.7 - 8.2 K/UL    Absolute Lymph # 0.6 0.5 - 4.6 K/UL    Absolute Mono # 0.3 0.1 - 1.3 K/UL    Absolute Eos # 0.1 0.0 - 0.8 K/UL    Basophils Absolute 0.0 0.0 - 0.2 K/UL    Absolute Immature Granulocyte 0.0 0.0 - 0.5 K/UL   Basic Metabolic Panel    Collection Time: 05/31/22  6:00 AM   Result Value Ref Range    Sodium 140 136 - 145 mmol/L    Potassium 3.3 (L) 3.5 - 5.1 mmol/L    Chloride 106 98 - 107 mmol/L    CO2 26 21 - 32 mmol/L    Anion Gap 8 7 - 16 mmol/L    Glucose 140 (H) 65 - 100 mg/dL    BUN 19 8 - 23 MG/DL    CREATININE 1.04 0.8 - 1.5 MG/DL    GFR African American >60 >60 ml/min/1.73m2    GFR Non- >60 >60 ml/min/1.73m2    Calcium 8.5 8.3 - 10.4 MG/DL      [unfilled]     Image:  I have personally reviewed patients imaging showing  MRI BRAIN WO CONTRAST   Final Result   1. No evidence of acute hemorrhage or infarct. 2. Chronic white matter disease, most likely small vessel microvascular   ischemia. 3. Generalized ventricular dilatation has slightly increased since prior and is   likely commensurate with age related atrophy (unless there is high clinical   suspicion for normal pressure hydrocephalus). 4. Stable small benign right basal ganglia cavernoma. XR CHEST 1 VIEW   Final Result   Cardiomegaly with interstitial pulmonary edema pattern. Atypical infection is   difficult to exclude. CT HEAD WO CONTRAST   Final Result   Stable CT head without contrast. No acute intracranial abnormality.                  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

## 2022-05-31 NOTE — PLAN OF CARE
Problem: Discharge Planning  Goal: Discharge to home or other facility with appropriate resources  Outcome: Progressing     Problem: Skin/Tissue Integrity  Goal: Absence of new skin breakdown  Description: 1. Monitor for areas of redness and/or skin breakdown  2. Assess vascular access sites hourly  3. Every 4-6 hours minimum:  Change oxygen saturation probe site  4. Every 4-6 hours:  If on nasal continuous positive airway pressure, respiratory therapy assess nares and determine need for appliance change or resting period. Outcome: Progressing     Problem: Safety - Adult  Goal: Free from fall injury  Outcome: Progressing     Problem: ABCDS Injury Assessment  Goal: Absence of physical injury  Outcome: Progressing     Problem: Confusion  Goal: Confusion, delirium, dementia, or psychosis is improved or at baseline  Description: INTERVENTIONS:  1. Assess for possible contributors to thought disturbance, including medications, impaired vision or hearing, underlying metabolic abnormalities, dehydration, psychiatric diagnoses, and notify attending LIP  2. [place] high risk fall precautions, as indicated  3. Provide frequent short contacts to provide reality reorientation, refocusing and direction  4. Decrease environmental stimuli, including noise as appropriate  5. Monitor and intervene to maintain adequate nutrition, hydration, elimination, sleep and activity  6. If unable to ensure safety without constant attention obtain sitter and review sitter guidelines with assigned personnel  7. Initiate Psychosocial CNS and Spiritual Care consult, as indicated  Outcome: Progressing     Problem: Safety - Medical Restraint  Goal: Remains free of injury from restraints (Restraint for Interference with Medical Device)  Description: INTERVENTIONS:  1. Determine that other, less restrictive measures have been tried or would not be effective before applying the restraint  2.  Evaluate the patient's condition at the time of restraint application  3. Inform patient/family regarding the reason for restraint  4.  Q2H: Monitor safety, psychosocial status, comfort, nutrition and hydration  Outcome: Progressing     Problem: Pain  Goal: Verbalizes/displays adequate comfort level or baseline comfort level  Outcome: Progressing

## 2022-05-31 NOTE — PROGRESS NOTES
OCCUPATIONAL THERAPY Initial Assessment, Daily Note and AM       OT Visit Days: 1  Acknowledge Orders  Time  OT Charge Capture  Rehab Caseload Tracker      Kimani Daily is a 80 y.o. male   PRIMARY DIAGNOSIS: Rhabdomyolysis  Rhabdomyolysis [M62.82]  Fall in home, initial encounter [W19. XXXA, S67.871]  Dementia without behavioral disturbance, unspecified dementia type ([facility] [address] [F03.90]  Unable to care for self [Z78.9]       Reason for Referral: Generalized Muscle Weakness (M62.81)  Other lack of cordination (R27.8)  Difficulty in walking, Not elsewhere classified (R26.2)  History of falling (Z91.81)  Inpatient: Payor: Anne-Marie Anderson / Plan: MEDICARE PART A AND B / Product Type: *No Product type* /     ASSESSMENT:     REHAB RECOMMENDATIONS:   Recommendation to date pending progress:  Setting:   Short-term Rehab    Equipment:     To Be Determined     ASSESSMENT:  Mr. Misael Madrigal is admitted for the above diagnoses and presents with overall deficits in strength, functional mobility and ADL performance. At baseline, he lives with spouse and has support from son. He is a poor historian this date, so PLOF is somewhat unknown. Today, he was able to perform bed mobility with mod A x2 and sit<>stand and a couple of steps at EOB with mod A x2. Brief and gown changed with max/total A. He required mod A for feeding while sitting EOB with breakfast tray. Presents with decreased cognition, mobility and ability to safely care for himself. He was left supine in bed with all needs met and in reach. Pt is functioning below their baseline and will benefit from skilled OT during hospital stay. Anticipate pt will benefit from STR upon discharge.      [address] [facility] [address] [address] AM-PAC 6 Clicks Daily Activity Inpatient Short Form:    AM-PAC Daily Activity Inpatient   How much help for putting on and taking off regular lower body clothing?: Total  How much help for Bathing?: A Lot  How much help for Toileting?: Total  How much help for putting on and taking off regular upper body clothing?: A Little  How much help for taking care of personal grooming?: A Lot  How much help for eating meals?: A Lot  AM-PAC Inpatient Daily Activity Raw Score: 11  AM-PAC Inpatient ADL T-Scale Score : 29.04  ADL Inpatient CMS 0-100% Score: 70.42  ADL Inpatient CMS G-Code Modifier : CL           SUBJECTIVE:     Mr. Barb Fallon states, \"Don't let me scoot off this bed\"     Social/Functional Lives With: Spouse  Type of Home: House  Receives Help From: Family  Occupation: Retired    OBJECTIVE:     Zee Donald / Valerie Pimentel / Chan Roberson: IV    RESTRICTIONS/PRECAUTIONS:  Restrictions/Precautions: Fall Risk    PAIN: VITALS / O2:   Pre Treatment:   Pain Assessment: Face, Legs, Activity, Cry, and Consolability (FLACC) (Simultaneous filing. User may not have seen previous data.)  Non-Pharmaceutical Pain Intervention(s): Repositioned  Response to Pain Intervention: Patient satisfied      Post Treatment: resting comfortably in bed       Vitals          Oxygen            GROSS EVALUATION: INTACT IMPAIRED   (See Comments)   UE AROM [] []pain in L UE with mobility or much weight bearing   UE PROM [] []   Strength [] LUE Strength  LUE Strength Comment: decreased strength overall  RUE Strength  RUE Strength Comment: decreased strength overall     Posture / Balance [] Posture: Fair  Sitting - Static: Good  Sitting - Dynamic: Poor  Standing - Static: Poor  Standing - Dynamic: Poor   Sensation []     Coordination [] Fine motor impairments,Gross motor impairments,Decreased accuracy,Right UE,Left UE     Tone []       Edema []    Activity Tolerance [] Patient Tolerated treatment well,Patient limited by fatigue     Hand Dominance R [x] L []      COGNITION/  PERCEPTION: INTACT IMPAIRED   (See Comments)   Orientation [] Oriented to person   Vision []     Hearing []     Cognition  [] Overall Cognitive Status: Exceptions  Arousal/Alertness: Appropriate responses to stimuli  Following Commands:  Follows one step commands consistently  Memory:  (dementia at baseline)     MOBILITY: I Mod I S SBA CGA Min Mod Max Total  NT x2 Comments:   Bed Mobility    Rolling [] [] [] [] [] [] [] [] [] [] []    Supine to Sit [] [] [] [] [] [x] [] [] [] [] [x]    Scooting [] [] [] [] [x] [] [] [] [] [] []    Sit to Supine [] [] [] [] [] [] [x] [] [] [] [x]    Transfers    Sit to Stand [] [] [] [] [] [] [x] [] [] [] [x]    Bed to Chair [] [] [] [] [] [] [] [] [] [] []    Stand to Sit [] [] [] [] [] [] [x] [] [] [] [x]    I=Independent, Mod I=Modified Independent, S=Supervision/Setup, SBA=Standby Assistance, CGA=Contact Guard Assistance, Min=Minimal Assistance, Mod=Moderate Assistance, Max=Maximal Assistance, Total=Total Assistance, NT=Not Tested    ACTIVITIES OF DAILY LIVING: I Mod I S SBA CGA Min Mod Max Total NT Comments   BASIC ADLs:              Bathing/ Showering [] [] [] [] [] [] [] [] [] [x]    Toileting [] [] [] [] [] [] [] [] [x] [] RN came to assist with brief change and hygiene while patient was in standing at EOB   Upper Body Dressing [] [] [] [] [] [] [] [x] [] [] Erminio Loach new gown   Lower Body Dressing [] [] [] [] [] [] [] [] [x] [] Erminio Loach socks   Feeding [] [] [] [] [] [] [x] [] [] [] Assist w loading fork (& sometimes bringing to mouth), holding cup, pacing, etc   Grooming [] [] [] [] [] [x] [] [] [] [] Wash face after breakfast    Personal Device Care [] [] [] [] [] [] [] [] [] [x]    Functional Mobility [] [] [] [] [] [] [x] [] [] [] x2   I=Independent, Mod I=Modified Independent, S=Supervision/Setup, SBA=Standby Assistance, CGA=Contact Guard Assistance, Min=Minimal Assistance, Mod=Moderate Assistance, Max=Maximal Assistance, Total=Total Assistance, NT=Not Tested    PLAN:     FREQUENCY/DURATION   OT Plan of Care: 3 times/week for duration of hospital stay or until stated goals are met, whichever comes first.    ACUTE OCCUPATIONAL THERAPY GOALS:   (Developed with and agreed upon by patient and/or caregiver.)  1.  Patient will complete feeding with stand by assist to increase self care independence. 2. Patient will complete toileting with moderate assist to increase self care independence. 3. Patient will improve static standing and dynamic standing at edge of bed for 10 minutes to improve independence with transfers and self cares. 4. Patient will tolerate 30 minutes of OT treatment with self incorporated rest breaks to increase activity tolerance to enhance participation in hobbies. 5. Patient will complete all functional transfers with minimal assist using adaptive equipment as needed. Timeframe: 7 visits       PROBLEM LIST:   (Skilled intervention is medically necessary to address:)  Decreased ADL/Functional Activities  Decreased Activity Tolerance  Decreased AROM/PROM  Decreased Balance  Decreased Cognition  Decreased Coordination  Decreased Gait Ability  Decreased Safety Awareness  Decreased Strength  Decreased Transfer Abilities  Increased Pain   INTERVENTIONS PLANNED:  (Benefits and precautions of occupational therapy have been discussed with the patient.)  Self Care Training  Therapeutic Activity  Therapeutic Exercise/HEP  Neuromuscular Re-education  Manual Therapy  Education         TREATMENT:     EVALUATION: MODERATE COMPLEXITY: (Untimed Charge)    TREATMENT:   Co-Treatment PT/OT necessary due to patient's decreased overall endurance/tolerance levels, as well as need for high level skilled assistance to complete functional transfers/mobility and functional tasks  Self Care: (27): Procedure(s) (per grid) utilized to improve and/or restore self-care/home management as related to dressing, toileting, grooming, self feeding and functional household mobility. Required minimal verbal, manual and tactile cueing to facilitate activities of daily living skills and compensatory activities.     TREATMENT GRID:  N/A    AFTER TREATMENT PRECAUTIONS: Alarm Activated, Bed, Call light within reach, Needs within reach and RN notified    INTERDISCIPLINARY COLLABORATION:  RN/ PCT and PT/ PTA    EDUCATION:  Education Given To: Patient  Education Provided: Role of Therapy;Plan of Care;ADL Adaptive Strategies;Transfer Training;Energy Conservation; Fall Prevention Strategies  Education Method: Demonstration;Verbal  Barriers to Learning: Cognition; Hearing  Education Outcome: Continued education needed    TOTAL TREATMENT DURATION AND TIME:  Time In: 9153  Time Out: 8917  Minutes: [address] [place], [place]

## 2022-06-01 VITALS
BODY MASS INDEX: 27.94 KG/M2 | HEART RATE: 108 BPM | DIASTOLIC BLOOD PRESSURE: 90 MMHG | RESPIRATION RATE: 16 BRPM | HEIGHT: 67 IN | SYSTOLIC BLOOD PRESSURE: 162 MMHG | OXYGEN SATURATION: 96 % | WEIGHT: 178 LBS | TEMPERATURE: 98.2 F

## 2022-06-01 PROBLEM — J18.9 CAP (COMMUNITY ACQUIRED PNEUMONIA): Status: ACTIVE | Noted: 2022-06-01

## 2022-06-01 PROBLEM — G93.40 ENCEPHALOPATHY: Status: ACTIVE | Noted: 2022-06-01

## 2022-06-01 LAB
ANION GAP SERPL CALC-SCNC: 9 MMOL/L (ref 7–16)
BASOPHILS # BLD: 0 K/UL (ref 0–0.2)
BASOPHILS NFR BLD: 1 % (ref 0–2)
BUN SERPL-MCNC: 17 MG/DL (ref 8–23)
CALCIUM SERPL-MCNC: 8.8 MG/DL (ref 8.3–10.4)
CHLORIDE SERPL-SCNC: 106 MMOL/L (ref 98–107)
CO2 SERPL-SCNC: 27 MMOL/L (ref 21–32)
CREAT SERPL-MCNC: 1 MG/DL (ref 0.8–1.5)
DIFFERENTIAL METHOD BLD: ABNORMAL
EKG ATRIAL RATE: 156 BPM
EKG DIAGNOSIS: NORMAL
EKG P AXIS: 0 DEGREES
EKG Q-T INTERVAL: 406 MS
EKG QRS DURATION: 128 MS
EKG QTC CALCULATION (BAZETT): 435 MS
EKG R AXIS: -16 DEGREES
EKG T AXIS: -14 DEGREES
EKG VENTRICULAR RATE: 69 BPM
EOSINOPHIL # BLD: 0.1 K/UL (ref 0–0.8)
EOSINOPHIL NFR BLD: 2 % (ref 0.5–7.8)
ERYTHROCYTE [DISTWIDTH] IN BLOOD BY AUTOMATED COUNT: 15.7 % (ref 11.9–14.6)
GLUCOSE SERPL-MCNC: 120 MG/DL (ref 65–100)
HCT VFR BLD AUTO: 37.6 % (ref 41.1–50.3)
HGB BLD-MCNC: 11.9 G/DL (ref 13.6–17.2)
IMM GRANULOCYTES # BLD AUTO: 0 K/UL (ref 0–0.5)
IMM GRANULOCYTES NFR BLD AUTO: 0 % (ref 0–5)
LYMPHOCYTES # BLD: 0.9 K/UL (ref 0.5–4.6)
LYMPHOCYTES NFR BLD: 23 % (ref 13–44)
MCH RBC QN AUTO: 30.5 PG (ref 26.1–32.9)
MCHC RBC AUTO-ENTMCNC: 31.6 G/DL (ref 31.4–35)
MCV RBC AUTO: 96.4 FL (ref 79.6–97.8)
MONOCYTES # BLD: 0.3 K/UL (ref 0.1–1.3)
MONOCYTES NFR BLD: 6 % (ref 4–12)
NEUTS SEG # BLD: 2.8 K/UL (ref 1.7–8.2)
NEUTS SEG NFR BLD: 68 % (ref 43–78)
NRBC # BLD: 0 K/UL (ref 0–0.2)
PLATELET # BLD AUTO: 60 K/UL (ref 150–450)
PMV BLD AUTO: 10.4 FL (ref 9.4–12.3)
POTASSIUM SERPL-SCNC: 3.2 MMOL/L (ref 3.5–5.1)
RBC # BLD AUTO: 3.9 M/UL (ref 4.23–5.6)
SARS-COV-2 RDRP RESP QL NAA+PROBE: NOT DETECTED
SODIUM SERPL-SCNC: 142 MMOL/L (ref 136–145)
SOURCE: NORMAL
WBC # BLD AUTO: 4.1 K/UL (ref 4.3–11.1)

## 2022-06-01 PROCEDURE — 6360000002 HC RX W HCPCS: Performed by: PSYCHIATRY & NEUROLOGY

## 2022-06-01 PROCEDURE — 6370000000 HC RX 637 (ALT 250 FOR IP): Performed by: NURSE PRACTITIONER

## 2022-06-01 PROCEDURE — 2580000003 HC RX 258: Performed by: NURSE PRACTITIONER

## 2022-06-01 PROCEDURE — 6360000002 HC RX W HCPCS: Performed by: INTERNAL MEDICINE

## 2022-06-01 PROCEDURE — 80048 BASIC METABOLIC PNL TOTAL CA: CPT

## 2022-06-01 PROCEDURE — 6370000000 HC RX 637 (ALT 250 FOR IP): Performed by: INTERNAL MEDICINE

## 2022-06-01 PROCEDURE — 87635 SARS-COV-2 COVID-19 AMP PRB: CPT

## 2022-06-01 PROCEDURE — 2580000003 HC RX 258: Performed by: INTERNAL MEDICINE

## 2022-06-01 PROCEDURE — 85025 COMPLETE CBC W/AUTO DIFF WBC: CPT

## 2022-06-01 PROCEDURE — 2580000003 HC RX 258: Performed by: PSYCHIATRY & NEUROLOGY

## 2022-06-01 PROCEDURE — 92610 EVALUATE SWALLOWING FUNCTION: CPT

## 2022-06-01 PROCEDURE — 36415 COLL VENOUS BLD VENIPUNCTURE: CPT

## 2022-06-01 PROCEDURE — 97530 THERAPEUTIC ACTIVITIES: CPT

## 2022-06-01 PROCEDURE — 2500000003 HC RX 250 WO HCPCS: Performed by: INTERNAL MEDICINE

## 2022-06-01 RX ORDER — THIAMINE MONONITRATE (VIT B1) 100 MG
100 TABLET ORAL DAILY
Qty: 30 TABLET | Refills: 0
Start: 2022-06-01

## 2022-06-01 RX ORDER — DIVALPROEX SODIUM 125 MG/1
125 CAPSULE, COATED PELLETS ORAL 2 TIMES DAILY
Qty: 60 CAPSULE | Refills: 0
Start: 2022-06-01

## 2022-06-01 RX ORDER — CEFUROXIME AXETIL 250 MG/1
250 TABLET ORAL 2 TIMES DAILY
Qty: 10 TABLET | Refills: 0
Start: 2022-06-01 | End: 2022-06-06

## 2022-06-01 RX ORDER — DOXYCYCLINE HYCLATE 100 MG
100 TABLET ORAL 2 TIMES DAILY
Qty: 10 TABLET | Refills: 0
Start: 2022-06-01 | End: 2022-06-06

## 2022-06-01 RX ADMIN — SODIUM CHLORIDE, PRESERVATIVE FREE 10 ML: 5 INJECTION INTRAVENOUS at 08:58

## 2022-06-01 RX ADMIN — CEFTRIAXONE 1000 MG: 1 INJECTION, POWDER, FOR SOLUTION INTRAMUSCULAR; INTRAVENOUS at 10:36

## 2022-06-01 RX ADMIN — ATORVASTATIN CALCIUM 40 MG: 40 TABLET, FILM COATED ORAL at 08:54

## 2022-06-01 RX ADMIN — SODIUM CHLORIDE, SODIUM LACTATE, POTASSIUM CHLORIDE, AND CALCIUM CHLORIDE: 600; 310; 30; 20 INJECTION, SOLUTION INTRAVENOUS at 00:10

## 2022-06-01 RX ADMIN — DIVALPROEX SODIUM 125 MG: 125 CAPSULE ORAL at 15:14

## 2022-06-01 RX ADMIN — TAMSULOSIN HYDROCHLORIDE 0.4 MG: 0.4 CAPSULE ORAL at 08:54

## 2022-06-01 RX ADMIN — ASPIRIN 81 MG: 81 TABLET, COATED ORAL at 08:54

## 2022-06-01 RX ADMIN — AMLODIPINE BESYLATE 5 MG: 5 TABLET ORAL at 08:54

## 2022-06-01 RX ADMIN — THIAMINE HYDROCHLORIDE 200 MG: 100 INJECTION, SOLUTION INTRAMUSCULAR; INTRAVENOUS at 08:58

## 2022-06-01 RX ADMIN — DOXYCYCLINE 100 MG: 100 INJECTION, POWDER, LYOPHILIZED, FOR SOLUTION INTRAVENOUS at 10:36

## 2022-06-01 RX ADMIN — DIVALPROEX SODIUM 125 MG: 125 CAPSULE ORAL at 05:49

## 2022-06-01 NOTE — DISCHARGE SUMMARY
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  *       Discharge Medications:     Medication List      START taking these medications    cefUROXime 250 MG tablet  Commonly known as: CEFTIN  Take 1 tablet by mouth 2 times daily for 5 days     divalproex 125 MG capsule  Commonly known as: DEPAKOTE SPRINKLE  Take 1 capsule by mouth 2 times daily     doxycycline hyclate 100 MG tablet  Commonly known as: VIBRA-TABS  Take 1 tablet by mouth 2 times daily for 5 days     vitamin B-1 100 MG tablet  Commonly known as: THIAMINE  Take 1 tablet by mouth daily        CONTINUE taking these medications    amLODIPine 5 MG tablet  Commonly known as: NORVASC     aspirin 81 MG EC tablet     atorvastatin 40 MG tablet  Commonly known as: LIPITOR     Combigan 0.2-0.5 % ophthalmic solution  Generic drug: brimonidine-timolol     furosemide 20 MG tablet  Commonly known as: LASIX     losartan 25 MG tablet  Commonly known as: COZAAR     potassium chloride 10 MEQ extended release tablet  Commonly known as: KLOR-CON M     tamsulosin 0.4 mg capsule  Commonly known as: FLOMAX        STOP taking these medications    cephALEXin 500 MG capsule  Commonly known as: KEFLEX     clindamycin 300 MG capsule  Commonly known as: CLEOCIN     mupirocin 2 % ointment  Commonly known as: Joel Downing           Where to Get Your Medications      Information about where to get these medications is not yet available    Ask your nurse or doctor about these medications  · cefUROXime 250 MG tablet  · divalproex 125 MG capsule  · doxycycline hyclate 100 MG tablet  · vitamin B-1 100 MG tablet          Pending Labs:  None    Follow-up (including scheduled tests):  PMD    History of Present Illness:  80 y.o. male with medical history of Alcohol abuse, Dementia, HTN, BPH, CVA, SDH, CAD  who presented after being found on the floor at home by his son. Unknown down time however assuming around 10 hours per family. Pt wife was also found on the floor and recalls she got up and fell and then he got up and fell over her. Pt confused at baseline. CT head without acute findings. Past Medical History:  Past Medical History:   Diagnosis Date    CAD (coronary artery disease)     Cholelithiasis 1/2014    Incidental Finding on CT Chest    Dyslipidemia     Hemorrhagic cerebrovascular accident (CVA) ([address] 5/19/2015    R basal ganglia bleed on CT scan head 5/19/15     Hypertension     PVD (peripheral vascular disease) ([address]     CXR 12/2013 - Moderate Atheroscleroitc Change in Aorta    SDH (subdural hematoma) ([facility]) 5/19/2015    Skin cancer     Traumatic subdural hematoma ([address] 5/19/2015    R sided SDH with antecedent h/o of multiple falls in weeks prior        Allergies:  No Known Allergies    Hospital Course:  80 y. o. male with medical history of Alcohol abuse, Dementia, HTN, BPH, CVA, SDH, CAD  who presented after being found on the floor at home by his son     1. Fall  -CT of the brain without acute intracranial abnormalities  -PT and OT  consulted and recommended rehab     2. Encephalopathy / Dementia  -CT brain without acute intracranial abnormalities  -Urinalysis without pyuria or bacteriuria  -Started depakote  -MRI brain without acute abnormalities but with chronic white matter disease and generalized ventricular dilatation  -Ammonia within normal levels  -Started on IV thiamine  -Neurology consulted  -Improved symptomatology     3. Rhabdomyolysis  -Started on IV fluids  -Improved CK levels    4.   Community-acquired pneumonia  -Started on ceftriaxone and doxycycline  -Improved symptomatology  -Discharged on cefuroxime and doxycycline       Procedures:  None    Discharge Day Information:  Follow with PMD    Diet: Easy to chew, cardiac    Activity: As tolerated    Discharge Physical Exam:  General: Alert, NAD  HEENT: NC/AT, EOM are intact  Neck: supple, no JVD  Cardiovascular: RRR, S1, S2, no murmurs  Respiratory: Lungs are clear, no wheezes or rales  Abdomen: Soft, NT, ND  Back: No CVA tenderness, no paraspinal tenderness  Extremities: LE without pedal edema, no erythema  Neuro: CN are intact, no focal deficits  Skin: no rash or ulcers      Recent Results (from the past 24 hour(s))   CBC with Auto Differential    Collection Time: 06/01/22  5:00 AM   Result Value Ref Range    WBC 4.1 (L) 4.3 - 11.1 K/uL    RBC 3.90 (L) 4.23 - 5.6 M/uL    Hemoglobin 11.9 (L) 13.6 - 17.2 g/dL    Hematocrit 37.6 (L) 41.1 - 50.3 %    MCV 96.4 79.6 - 97.8 FL    MCH 30.5 26.1 - 32.9 PG    MCHC 31.6 31.4 - 35.0 g/dL    RDW 15.7 (H) 11.9 - 14.6 %    Platelets 60 (L) 492 - 450 K/uL    MPV 10.4 9.4 - 12.3 FL    nRBC 0.00 0.0 - 0.2 K/uL    Differential Type AUTOMATED      Seg Neutrophils 68 43 - 78 %    Lymphocytes 23 13 - 44 %    Monocytes 6 4.0 - 12.0 %    Eosinophils % 2 0.5 - 7.8 %    Basophils 1 0.0 - 2.0 %    Immature Granulocytes 0 0.0 - 5.0 %    Segs Absolute 2.8 1.7 - 8.2 K/UL    Absolute Lymph # 0.9 0.5 - 4.6 K/UL    Absolute Mono # 0.3 0.1 - 1.3 K/UL    Absolute Eos # 0.1 0.0 - 0.8 K/UL    Basophils Absolute 0.0 0.0 - 0.2 K/UL    Absolute Immature Granulocyte 0.0 0.0 - 0.5 K/UL   Basic Metabolic Panel    Collection Time: 06/01/22  5:00 AM   Result Value Ref Range    Sodium 142 136 - 145 mmol/L    Potassium 3.2 (L) 3.5 - 5.1 mmol/L    Chloride 106 98 - 107 mmol/L    CO2 27 21 - 32 mmol/L    Anion Gap 9 7 - 16 mmol/L    Glucose 120 (H) 65 - 100 mg/dL    BUN 17 8 - 23 MG/DL    CREATININE 1.00 0.8 - 1.5 MG/DL    GFR African American >60 >60 ml/min/1.73m2    GFR Non- >60 >60 ml/min/1.73m2 Calcium 8.8 8.3 - 10.4 MG/DL   COVID-19, Rapid    Collection Time: 06/01/22 11:57 AM    Specimen: Nasopharyngeal   Result Value Ref Range    Source Nasopharyngeal      SARS-CoV-2, Rapid Not detected NOTD          MRI BRAIN WO CONTRAST   Final Result   1. No evidence of acute hemorrhage or infarct. 2. Chronic white matter disease, most likely small vessel microvascular   ischemia. 3. Generalized ventricular dilatation has slightly increased since prior and is   likely commensurate with age related atrophy (unless there is high clinical   suspicion for normal pressure hydrocephalus). 4. Stable small benign right basal ganglia cavernoma. XR CHEST 1 VIEW   Final Result   Cardiomegaly with interstitial pulmonary edema pattern. Atypical infection is   difficult to exclude. CT HEAD WO CONTRAST   Final Result   Stable CT head without contrast. No acute intracranial abnormality.                  Condition: Improved    Disposition: Rehab    Consultants During This Hospitalization: Neurology           Spent 31 minutes on discharge services

## 2022-06-01 NOTE — CARE COORDINATION
Bed offer received and accepted at [facility]. Pts wife was accepted at this facility as well. Bed is available today and patient is medically stable for discharge. CM spoke with patient's son, Chase Bingham who confirmed plans. Stretcher transport scheduled for [employer]. Discharge packet prepared and report information provided to bedside nurse. No additional discharge planning needs noted. 05/27/22 7726   Service Assessment   History Provided By Child/Family   Primary Caregiver Family   Support Systems Spouse/Significant Other;Children   PCP Verified by CM Yes   Can patient return to prior living arrangement No   Family able to assist with home care needs: Yes   Financial Resources Medicare   Social/Functional History   Lives With Spouse   Type of [address] [place] [address] Aleaxndra Starkd Help From Family   Occupation Retired   Discharge Planning   Type of [facility] Prior To Admission None   [address]   Patient expects to be discharged to: Skilled nursing facility   History of falls? 1   Services At/After Discharge   Transition of Care Consult (CM Consult) Discharge Planning;SNF   Mode of Transport at Discharge BLS   Confirm Follow Up Transport Family   Condition of Participation: Discharge Planning   The Plan for Transition of Care is related to the following treatment goals: STR   The Patient and/or Patient Representative was provided with a Choice of Provider? Patient Representative   Name of the Patient Representative who was provided with the Choice of Provider and agrees with the Discharge Plan? Vahid Patel   The Patient and/Or Patient Representative agree with the Discharge Plan? Yes   Freedom of Choice list was provided with basic dialogue that supports the patient's individualized plan of care/goals, treatment preferences, and shares the quality data associated with the providers?

## 2022-06-01 NOTE — PROGRESS NOTES
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  Date of Eval: 2022  ICD-10: Treatment Diagnosis: R13.11 Dysphagia, Oral Phase    RECOMMENDATIONS   Diet:  Diet Solids Recommendation: Easy to Chew  Liquid Consistency Recommendation: Thin    Medications: Whole with water         Compensatory Swallowing Strategies: none   Therapeutic Intervention: none   Patient continues to require skilled intervention: No  D/C Recommendations: No follow up therapy recommended post discharge     ASSESSMENT    Dysphagia Diagnosis: Swallow function appears WFL,Mild oral stage dysphagia  Dysphagia Impression : No overt signs/sx of aspiration with presented trials of thin via cup and straw, pureed and mixed. Solid not assessed. Increased mastication with chewable textures secondary to poor dentition. Recommend easy to chew with thin liquids with medications as tolerated. Speech difficult to understand. ? Baseline cognition. No further ST indicated. Please re consult if indicated.        GENERAL    History of Present Injury/Illness: Mr. Carlos Quinn  has a past medical history of CAD (coronary artery disease), Cholelithiasis, Dyslipidemia, Hemorrhagic cerebrovascular accident (CVA) ([facility] [address] Hypertension, PVD (peripheral vascular disease) ([facility] [address] SDH (subdural hematoma) ([facility] [address] Skin cancer, and Traumatic subdural hematoma ([facility] [address] . He also  has a past surgical history that includes malignant skin lesion excision and Coronary angioplasty with stent. Chart Reviewed: Yes  Subjective: upright in bed  Behavior/Cognition: Alert;Pleasant mood;Confused  Patient Position: upright in bed  Communication Observation: Dysarthria  Follows Directions: Simple  Respiratory Status: Room air              Prior Dysphagia History: none  Patient Complaint: none  Current Diet : Regular  Current Liquid Diet : Thin  Pain:         0              Vision: Within Functional Limits            Hearing: Within functional limits       OBJECTIVE    Patient Positioning: Upright in bed   Oral Motor   Labial: No impairment  Dentition: Natural;Limited  Oral Hygiene Comments: adequate  Lingual: No impairment  Dentition: Some missing teeth;Poor dental/oral hygeine        Baseline Vocal Quality: Normal  Oropharyngeal Phase: Thin - cup; Thin - straw;Pureed;Mixed Consistencies  Neuromuscular Estim Used: No  Assessment Method(s): Observation  Patient Position: upright in bed  Vocal Quality: No Impairment  Consistency Presented: Pureed;Thin;Mixed consistency  How Presented: Self-fed/presented;SLP-fed/Presented;Cup/sip;Spoon;Straw  Bolus Acceptance: No impairment  Bolus Formation/Control: Impaired  Type of Impairment: Mastication  Propulsion: No impairment  Oral Residue: Less than 10% of bolus  Initiation of Swallow: No impairment  Aspiration Signs/Symptoms: None  Pharyngeal Phase Characteristics: No impairment, issues, or problems              PLAN    Duration/Frequency: No treatment indicated at this time    Dysphagia Outcome and Severity Scale (JOSE)  Dysphagia Outcome Severity Scale: Level 5: Mild dysphagia- Distant supervision.  May need one

## 2022-06-01 NOTE — PROGRESS NOTES
PHYSICAL THERAPY Daily Note and AM  (Link to Caseload Tracking: PT Visit Days : 1  Time In/Out PT Charge Capture  Rehab Caseload Tracker  Orders      Felisha Hamilton is a 80 y.o. male   PRIMARY DIAGNOSIS: Rhabdomyolysis  Rhabdomyolysis [M62.82]  Fall in home, initial encounter [W19. XXXA, O30.279]  Dementia without behavioral disturbance, unspecified dementia type ([address] [F03.90]  Unable to care for self [Z78.9]       Inpatient: Payor: MEDICARE / Plan: MEDICARE PART A AND B / Product Type: *No Product type* /     ASSESSMENT:     REHAB RECOMMENDATIONS:   Recommendation to date pending progress:  Setting:   plans for snf    Equipment:     To Be Determined     ASSESSMENT:  Mr. Radha Oliveira reports wanting to get up. He is somewhat confused but did know where he was this am.  He was very talkative but difficult to understand. He needed less assistance today to mobilize but is still very unsafe without assistance. He took steps by the bed x 2 with assistance with RW this am.  He tends to lean posteriorly and has more difficulty putting weight on left LE which he says is from his left knee. Left supine with bed alarm on.      SUBJECTIVE:   Mr. Radha Oliveira states, \"im doing good\"     Social/Functional Lives With: Spouse  Type of Home: House  Receives Help From: Family  Occupation: Retired  OBJECTIVE:     PAIN: VITALS / O2: PRECAUTION / Elizabeth Reyes / Aman Oas:   Pre Treatment:          Post Treatment: complains of knee pain Vitals        Oxygen    IV and Purewick    RESTRICTIONS/PRECAUTIONS:  Restrictions/Precautions  Restrictions/Precautions: Fall Risk  Required Braces or Orthoses?: No  Restrictions/Precautions: Fall Risk  Required Braces or Orthoses?: No     MOBILITY: I Mod I S SBA CGA Min Mod Max Total  NT x2 Comments:   Bed Mobility    Rolling [] [] [] [] [] [] [] [] [] [] []    Supine to Sit [] [] [] [] [] [x] [] [] [] [] []    Scooting [] [] [] [] [] [] [] [] [] [] []    Sit to Supine [] [] [] [] [] [x] [x] [] [] [] [] Transfers    Sit to Stand [] [] [] [] [] [x] [x] [] [] [] []    Bed to Chair [] [] [] [] [] [] [] [] [] [] []    Stand to Sit [] [] [] [] [] [x] [] [] [] [] []     [] [] [] [] [] [] [] [] [] [] []    I=Independent, Mod I=Modified Independent, S=Supervision, SBA=Standby Assistance, CGA=Contact Guard Assistance,   Min=Minimal Assistance, Mod=Moderate Assistance, Max=Maximal Assistance, Total=Total Assistance, NT=Not Tested    BALANCE: Good Fair+ Fair Fair- Poor NT Comments   Sitting Static [x] [] [] [] [] []    Sitting Dynamic [] [x] [] [] [] []              Standing Static [] [] [] [x] [] [] With assistance and RW   Standing Dynamic [] [] [] [x] [] []      GAIT: I Mod I S SBA CGA Min Mod Max Total  NT x2 Comments:   Level of Assistance [] [] [] [] [] [x] [x] [] [] [] []    Distance 5 (x 2 and marching in place with RW) feet    DME Rolling Walker    Gait Quality Antalgic and Decreased stance    Weightbearing Status      Stairs      I=Independent, Mod I=Modified Independent, S=Supervision, SBA=Standby Assistance, CGA=Contact Guard Assistance,   Min=Minimal Assistance, Mod=Moderate Assistance, Max=Maximal Assistance, Total=Total Assistance, NT=Not Tested    PLAN:   ACUTE PHYSICAL THERAPY GOALS:   (Developed with and agreed upon by patient and/or caregiver.)  STG:  (1.)Mr. Tree Vazquez will move from supine to sit and sit to supine  with MODERATE ASSIST within 4-7 treatment day(s). (2.)Mr. Tree Vazquez will transfer from bed to chair and chair to bed with MODERATE ASSIST using the least restrictive device within 4-7 treatment day(s). (3.)Mr. Tree Vazquze will ambulate with MINIMAL ASSIST of 2 for 50 feet with the least restrictive device within 4-7 treatment day(s). FREQUENCY AND DURATION: Daily for duration of hospital stay or until stated goals are met, whichever comes first.    TREATMENT:   TREATMENT:   Therapeutic Activity (30 Minutes):  Therapeutic activity included Supine to Sit, Sit to Supine, Scooting, Transfer Training, Ambulation on level ground, Sitting balance  and Standing balance to improve functional Activity tolerance, Balance, Mobility, Strength and ROM.       AFTER TREATMENT PRECAUTIONS: Alarm Activated, Bed, Bed/Chair Locked, Call light within reach, Needs within reach and RN notified    INTERDISCIPLINARY COLLABORATION:  RN/ PCT    EDUCATION: Education Given To: Patient  Education Provided: Transfer Training  Barriers to Learning: Cognition  Education Outcome: Verbalized understanding;Continued education needed    TIME IN/OUT:  Time In: 0820  Time Out: 1245  Minutes: [address], PT

## 2022-06-02 ENCOUNTER — CARE COORDINATION (OUTPATIENT)
Dept: CARE COORDINATION | Facility: CLINIC | Age: 87
End: 2022-06-02

## 2022-06-02 NOTE — CARE COORDINATION
Patient discharged to [facility] on 6/1/22. SELVIN outreach postponed for 21 days due to discharge to non-preferred network SNF.

## 2022-06-06 ENCOUNTER — HOSPITAL ENCOUNTER (OUTPATIENT)
Dept: LAB | Age: 87
Discharge: HOME | End: 2022-06-09

## 2022-06-10 NOTE — PROGRESS NOTES
Physician Progress Note      PATIENT:               Torsten Markham  CSN #:                  [id number]  :                       3/12/1927  ADMIT DATE:       2022 2:17 PM  [address] [place] DATE:        2022 5:01 PM  RESPONDING  PROVIDER #:        Abiel Acuna MD          QUERY TEXT:    Pt admitted with Fall at home, down approx 12h. Pt noted to have   rhabdomyolysis documented. If possible, please document in progress notes and   discharge summary if you are evaluating and/or treating any of the following: The medical record reflects the following:  Risk Factors: Fall at home, in floor apprx 12h  Clinical Indicators: , 1773, 554  Treatment: NS 50/h, changed to /h  Thanks, KELSEY Mabry    Per [email or web address]  Traumatic rhabdomyolysis cause examples: crush syndrome, prolonged   immobilization  Nontraumatic rhabdomyolysis cause examples:  marked exertion, hyperthermia,   metabolic myopathy, drugs or toxins, infections, electrolyte disorders. Options provided:  -- Traumatic rhabdomyolysis  -- Nontraumatic rhabdomyolysis  -- Other - I will add my own diagnosis  -- Disagree - Not applicable / Not valid  -- Disagree - Clinically unable to determine / Unknown  -- Refer to Clinical Documentation Reviewer    PROVIDER RESPONSE TEXT:    This patient has traumatic rhabdomyolysis.     Query created by: Kayli Maldonado on 6/10/2022 12:07 PM      Electronically signed by:  Abiel Acuna MD 6/10/2022 3:26 PM

## 2022-06-23 ENCOUNTER — CARE COORDINATION (OUTPATIENT)
Dept: CARE COORDINATION | Facility: CLINIC | Age: 87
End: 2022-06-23

## 2022-06-23 NOTE — CARE COORDINATION
Attempted SELVIN outreach, verified with staff, patient remains currently admitted for care at [address] [place] [address]. No further outreach is indicated at this time.

## 2023-06-04 ENCOUNTER — HOSPITAL ENCOUNTER (EMERGENCY)
Age: 88
Discharge: HOME | End: 2023-06-04
Attending: EMERGENCY MEDICINE
Payer: MEDICARE

## 2023-06-04 VITALS
TEMPERATURE: 97.8 F | WEIGHT: 170 LBS | OXYGEN SATURATION: 96 % | RESPIRATION RATE: 20 BRPM | HEART RATE: 66 BPM | DIASTOLIC BLOOD PRESSURE: 66 MMHG | SYSTOLIC BLOOD PRESSURE: 117 MMHG | BODY MASS INDEX: 25.18 KG/M2 | HEIGHT: 69 IN

## 2023-06-04 DIAGNOSIS — F03.911 DEMENTIA WITH AGITATION, UNSPECIFIED DEMENTIA SEVERITY, UNSPECIFIED DEMENTIA TYPE (HCC): Primary | ICD-10-CM

## 2023-06-04 DIAGNOSIS — S01.81XA FACIAL LACERATION, INITIAL ENCOUNTER: ICD-10-CM

## 2023-06-04 PROCEDURE — 99283 EMERGENCY DEPT VISIT LOW MDM: CPT

## 2023-06-04 ASSESSMENT — ENCOUNTER SYMPTOMS
NAUSEA: 0
DIARRHEA: 0
SHORTNESS OF BREATH: 0
BACK PAIN: 0
ABDOMINAL PAIN: 0
EYES NEGATIVE: 1

## 2023-06-04 ASSESSMENT — LIFESTYLE VARIABLES: HOW MANY STANDARD DRINKS CONTAINING ALCOHOL DO YOU HAVE ON A TYPICAL DAY: PATIENT DOES NOT DRINK

## 2023-06-04 ASSESSMENT — PAIN - FUNCTIONAL ASSESSMENT: PAIN_FUNCTIONAL_ASSESSMENT: NONE - DENIES PAIN

## 2023-09-12 ENCOUNTER — HOSPICE ADMISSION (OUTPATIENT)
Dept: HOSPICE | Facility: HOSPICE | Age: 88
End: 2023-09-12
Payer: MEDICARE

## 2023-09-13 ENCOUNTER — HOME CARE VISIT (OUTPATIENT)
Dept: HOSPICE | Facility: HOSPICE | Age: 88
End: 2023-09-13
Payer: MEDICARE

## 2023-09-13 VITALS
DIASTOLIC BLOOD PRESSURE: 72 MMHG | OXYGEN SATURATION: 98 % | BODY MASS INDEX: 25.18 KG/M2 | SYSTOLIC BLOOD PRESSURE: 125 MMHG | TEMPERATURE: 98.4 F | WEIGHT: 170 LBS | HEIGHT: 69 IN | HEART RATE: 86 BPM | RESPIRATION RATE: 18 BRPM

## 2023-09-13 PROCEDURE — G0299 HHS/HOSPICE OF RN EA 15 MIN: HCPCS

## 2023-09-13 PROCEDURE — 0651 HSPC ROUTINE HOME CARE

## 2023-09-13 PROCEDURE — 2500000001 HSPC NON INJECTABLE MED

## 2023-09-13 RX ORDER — BUSPIRONE HYDROCHLORIDE 10 MG/1
TABLET ORAL
COMMUNITY
End: 2023-09-13

## 2023-09-13 ASSESSMENT — ENCOUNTER SYMPTOMS: CONSTIPATION: 1
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
## 2023-09-14 ENCOUNTER — HOME CARE VISIT (OUTPATIENT)
Dept: SCHEDULING | Facility: HOME HEALTH | Age: 88
End: 2023-09-14
Payer: MEDICARE

## 2023-09-14 ENCOUNTER — HOME CARE VISIT (OUTPATIENT)
Dept: HOSPICE | Facility: HOSPICE | Age: 88
End: 2023-09-14
Payer: MEDICARE

## 2023-09-14 PROCEDURE — G0155 HHCP-SVS OF CSW,EA 15 MIN: HCPCS

## 2023-09-14 PROCEDURE — G0299 HHS/HOSPICE OF RN EA 15 MIN: HCPCS

## 2023-09-14 PROCEDURE — 0651 HSPC ROUTINE HOME CARE

## 2023-09-14 NOTE — HOSPICE
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 Ed has a DNR. [facility] with [facility] at [facility]. Ed lives with his wife, Florence Amin, who is also on OA services. Ed was dressed and laying on his hospice bed that is by the window of their suite. He was alert, verbal (sometimes difficult to understand what he is saying) appearing comfortable with no pain level expressed or observed. Confusion noted in conversation. Ed was able to answer some questions with Yes/no and small but lengthier responses. As the responses became longer the more difficult it was to understand him. He would go from clear word/words to mumbling. Ed stated he was eating good. Did not have any pain. Stated that if you promise something you need to make good on it. When shared with his son, Lynder Collet stated that was his Dad and in his day and in his work, they depended upon the honor of one's word. Most information was received when speaking via phone to pt's son, Lynder Collet. Keith and his wife, Florence Amin, have been  68 yrs. They have 1 child, Lynder Collet, no grandchildren. Ed has 5  siblings. 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 They are of the [place] Christian and of the [address] tradition. No local Spiritism/. Stated he believes in God/Jaiden. Welcomes visits and conversation.  provided spiritual care through pastoral conversation, active listening, supportive responses, and prayer.  Services/spiritual care is welcome into the home.  will provide spiritual care through pastoral conversation, active listening, supportive responses, scripture reading, and prayer as needed/requested according to their bess tradition at the rate of 1x per month plus 2 prns.  has read and agrees with the POC.   Next regular

## 2023-09-15 ENCOUNTER — HOME CARE VISIT (OUTPATIENT)
Dept: SCHEDULING | Facility: HOME HEALTH | Age: 88
End: 2023-09-15
Payer: MEDICARE

## 2023-09-15 PROCEDURE — G0156 HHCP-SVS OF AIDE,EA 15 MIN: HCPCS

## 2023-09-15 PROCEDURE — 0651 HSPC ROUTINE HOME CARE

## 2023-09-16 PROCEDURE — 0651 HSPC ROUTINE HOME CARE

## 2023-09-16 PROCEDURE — 2500000001 HSPC NON INJECTABLE MED

## 2023-09-17 PROCEDURE — 0651 HSPC ROUTINE HOME CARE

## 2023-09-18 PROCEDURE — 0651 HSPC ROUTINE HOME CARE

## 2023-09-18 NOTE — HOSPICE
Pt is aaox1 and is sitting on his bed in his room. Wife Jevon Kaminski also has dementia and shares a room with pt. Pt answers simple questions, but not always appropriately. Pt does not indicate pain or discomfort at this time. Social visit provided to pt and his wife. No needs voiced by facility staff.

## 2023-09-19 PROCEDURE — 0651 HSPC ROUTINE HOME CARE

## 2023-09-20 ENCOUNTER — HOME CARE VISIT (OUTPATIENT)
Dept: HOSPICE | Facility: HOSPICE | Age: 88
End: 2023-09-20
Payer: MEDICARE

## 2023-09-20 ENCOUNTER — HOME CARE VISIT (OUTPATIENT)
Dept: SCHEDULING | Facility: HOME HEALTH | Age: 88
End: 2023-09-20
Payer: MEDICARE

## 2023-09-20 PROCEDURE — 0651 HSPC ROUTINE HOME CARE

## 2023-09-20 PROCEDURE — 2500000001 HSPC NON INJECTABLE MED

## 2023-09-21 ENCOUNTER — HOME CARE VISIT (OUTPATIENT)
Dept: SCHEDULING | Facility: HOME HEALTH | Age: 88
End: 2023-09-21
Payer: MEDICARE

## 2023-09-21 VITALS — DIASTOLIC BLOOD PRESSURE: 68 MMHG | HEART RATE: 78 BPM | RESPIRATION RATE: 16 BRPM | SYSTOLIC BLOOD PRESSURE: 118 MMHG

## 2023-09-21 PROCEDURE — G0156 HHCP-SVS OF AIDE,EA 15 MIN: HCPCS

## 2023-09-21 PROCEDURE — G0299 HHS/HOSPICE OF RN EA 15 MIN: HCPCS

## 2023-09-21 PROCEDURE — 0651 HSPC ROUTINE HOME CARE

## 2023-09-21 NOTE — HOSPICE
RN Case Manager Initial Visit        A 84DP with a hospice diagnosis of Alzheimer's disease with behavioral disturbance    *Pt Identification (ID): Pt's identity is confirmed using unique identifiers: name and date of birth and pt identified by facility staff. *Vital Signs (VS):118/68 P78 R16  *Neurological/Level of Consciousness (LOC): Pt is alert, fully oriented to self, communicates with mainly with expressive aphasia (1-3 words may be understood). *Pain: Via PainAD pain scale, pt has no signs or symptoms of pain/discomfort. Via head shake, pt denies any pain. *Cardiovascular: Heart sounds are of an irregular rate and rhythm    *Respiratory: No noted respiratory distress at rest, respirations are even and unlabored. Lung sounds are clear but diminished at the bases. *Urinary/Gastrointestinal (Void/BM): Pt denies any voiding concerns. Bowel sounds present in all quads. BMs are reported less daily. Pt is incontinent of bowel & bladder. *Appetite/Nutrition: Pt is eating and is self-fed. His appetite is fair and nutritional intake is inadequate. No reported nausea/vomiting. *Integumentary (Skin): Skin is not assessed this visit, pt would not allow. Facility staff reports no skin concerns at this time. *Musculoskeletal/Ambulation: Pt is chairfast, uses a wheelchair for mobility and pt is able to bear weight for transfers. *Palliative Performance Scale (PPS) = 40%    *Fall Safety/Prevention Education: Fall safety and prevention strategies review occurs each visit with available [facility] staff persons. An understanding is voiced regarding the fall safety/prevention review. It is desired that pt remain safe in his home setting and be free from moderate to severe injury from a fall. *Medications reviewed/managed: Pt is taking medications as prescribed and medications are managed by Filippo [facility] staff. [facility]  will review medications weekly.   *This nurse addresses all issues/concerns and

## 2023-09-22 PROCEDURE — 0651 HSPC ROUTINE HOME CARE

## 2023-09-23 PROCEDURE — 0651 HSPC ROUTINE HOME CARE

## 2023-09-24 PROCEDURE — 0651 HSPC ROUTINE HOME CARE

## 2023-09-25 VITALS — RESPIRATION RATE: 16 BRPM | DIASTOLIC BLOOD PRESSURE: 64 MMHG | HEART RATE: 81 BPM | SYSTOLIC BLOOD PRESSURE: 119 MMHG

## 2023-09-25 PROCEDURE — 0651 HSPC ROUTINE HOME CARE

## 2023-09-25 ASSESSMENT — ENCOUNTER SYMPTOMS
STOOL DESCRIPTION: FORMED
BOWEL INCONTINENCE: 1

## 2023-09-25 NOTE — HOSPICE
weekly. Coordination/Collaboration of care with facility staff who is advised to contact Rubia Ortiz [phone] regarding any pt concerns, falls and/or any change in pt's condition. Facility staff voices an understanding regarding call hospice first, not 911.

## 2023-09-26 ENCOUNTER — HOSPITAL ENCOUNTER (OUTPATIENT)
Dept: LAB | Age: 88
Discharge: HOME | End: 2023-09-29
Payer: MEDICARE

## 2023-09-26 ENCOUNTER — HOME CARE VISIT (OUTPATIENT)
Dept: SCHEDULING | Facility: HOME HEALTH | Age: 88
End: 2023-09-26
Payer: MEDICARE

## 2023-09-26 ENCOUNTER — HOME CARE VISIT (OUTPATIENT)
Dept: HOSPICE | Facility: HOSPICE | Age: 88
End: 2023-09-26
Payer: MEDICARE

## 2023-09-26 LAB
ANION GAP SERPL CALC-SCNC: 9 MMOL/L (ref 2–11)
BASOPHILS # BLD: 0.1 K/UL (ref 0–0.2)
BASOPHILS NFR BLD: 1 % (ref 0–2)
BUN SERPL-MCNC: 21 MG/DL (ref 8–23)
CALCIUM SERPL-MCNC: 8.5 MG/DL (ref 8.3–10.4)
CHLORIDE SERPL-SCNC: 103 MMOL/L (ref 101–110)
CO2 SERPL-SCNC: 30 MMOL/L (ref 21–32)
CREAT SERPL-MCNC: 1.63 MG/DL (ref 0.8–1.5)
DIFFERENTIAL METHOD BLD: ABNORMAL
EOSINOPHIL # BLD: 0.4 K/UL (ref 0–0.8)
EOSINOPHIL NFR BLD: 6 % (ref 0.5–7.8)
ERYTHROCYTE [DISTWIDTH] IN BLOOD BY AUTOMATED COUNT: 14.6 % (ref 11.9–14.6)
GLUCOSE SERPL-MCNC: 132 MG/DL (ref 65–100)
HCT VFR BLD AUTO: 41.4 % (ref 41.1–50.3)
HGB BLD-MCNC: 13.3 G/DL (ref 13.6–17.2)
IMM GRANULOCYTES # BLD AUTO: 0 K/UL (ref 0–0.5)
IMM GRANULOCYTES NFR BLD AUTO: 0 % (ref 0–5)
LYMPHOCYTES # BLD: 2 K/UL (ref 0.5–4.6)
LYMPHOCYTES NFR BLD: 32 % (ref 13–44)
MAGNESIUM SERPL-MCNC: 2.3 MG/DL (ref 1.8–2.4)
MCH RBC QN AUTO: 31.5 PG (ref 26.1–32.9)
MCHC RBC AUTO-ENTMCNC: 32.1 G/DL (ref 31.4–35)
MCV RBC AUTO: 98.1 FL (ref 82–102)
MONOCYTES # BLD: 0.4 K/UL (ref 0.1–1.3)
MONOCYTES NFR BLD: 6 % (ref 4–12)
NEUTS SEG # BLD: 3.4 K/UL (ref 1.7–8.2)
NEUTS SEG NFR BLD: 55 % (ref 43–78)
NRBC # BLD: 0 K/UL (ref 0–0.2)
PLATELET # BLD AUTO: 126 K/UL (ref 150–450)
PMV BLD AUTO: 9.6 FL (ref 9.4–12.3)
POTASSIUM SERPL-SCNC: 3.9 MMOL/L (ref 3.5–5.1)
RBC # BLD AUTO: 4.22 M/UL (ref 4.23–5.6)
SODIUM SERPL-SCNC: 142 MMOL/L (ref 133–143)
WBC # BLD AUTO: 6.2 K/UL (ref 4.3–11.1)

## 2023-09-26 PROCEDURE — G0299 HHS/HOSPICE OF RN EA 15 MIN: HCPCS

## 2023-09-26 PROCEDURE — 0651 HSPC ROUTINE HOME CARE

## 2023-09-26 PROCEDURE — G0156 HHCP-SVS OF AIDE,EA 15 MIN: HCPCS

## 2023-09-26 PROCEDURE — 83735 ASSAY OF MAGNESIUM: CPT

## 2023-09-26 PROCEDURE — 85025 COMPLETE CBC W/AUTO DIFF WBC: CPT

## 2023-09-26 PROCEDURE — 80048 BASIC METABOLIC PNL TOTAL CA: CPT

## 2023-09-27 ENCOUNTER — HOME CARE VISIT (OUTPATIENT)
Dept: HOSPICE | Facility: HOSPICE | Age: 88
End: 2023-09-27
Payer: MEDICARE

## 2023-09-27 ENCOUNTER — HOME CARE VISIT (OUTPATIENT)
Dept: SCHEDULING | Facility: HOME HEALTH | Age: 88
End: 2023-09-27
Payer: MEDICARE

## 2023-09-27 PROCEDURE — 0651 HSPC ROUTINE HOME CARE

## 2023-09-27 PROCEDURE — 2500000001 HSPC NON INJECTABLE MED

## 2023-09-27 NOTE — HOSPICE
PRN visit for lab draw. Pt and wife in room. Informed pt of lab draw. Blood drawn for CBC, BMP, Mg from Right Antecubital #23 butterfly.  Pt tolerated lab draw well. specimen taken to [facility]

## 2023-09-28 PROCEDURE — 0651 HSPC ROUTINE HOME CARE

## 2023-09-29 PROCEDURE — 0651 HSPC ROUTINE HOME CARE

## 2023-09-30 PROCEDURE — 0651 HSPC ROUTINE HOME CARE

## 2023-10-01 PROCEDURE — 0651 HSPC ROUTINE HOME CARE

## 2023-10-02 VITALS — HEART RATE: 68 BPM | DIASTOLIC BLOOD PRESSURE: 58 MMHG | RESPIRATION RATE: 16 BRPM | SYSTOLIC BLOOD PRESSURE: 118 MMHG

## 2023-10-02 ASSESSMENT — ENCOUNTER SYMPTOMS
BOWEL INCONTINENCE: 1
STOOL DESCRIPTION: FORMED

## 2023-10-02 NOTE — HOSPICE
Dr Julieta Cruz MD Home Visit        A 72SZ with a hospice diagnosis of Alzheimer's disease with behavioral disturbance      Pt is alert and fully oriented to self. Pt is able to communicate but speech is with mainly with expressive aphasia (1-3 words may be understood). Pt is not completely able to make needs known. Pt can sometimes construct an appropriate sentence as portions of his speech is normal. Pt is noted having difficulty finding words to express himself, therefore; speech is garbled. Pt must be monitored for needs. With permission from pt, the hospice MD performs a mini physical and cognitive assessment. Pt's current medications are thoroughly reviewed. Medication adjustments initiated and new medication(s) order(s) are discussed/explained to pt who voices an understanding to new medication order(s). MD addresses any  issues, concerns and answered all questions for the pt. Via PainAD pain scale, pt has no signs or symptoms of pain/discomfort. Via head shake, pt denies any pain. Heart sounds are of an irregular rate and rhythm. No noted respiratory distress at rest, respirations are even and unlabored but are noted to increase with exertion. Lung sounds are clear in the upper lobes and diminished with faint crackles in the bases. Pt denies any voiding concerns. Bowel sounds present in all quads. BMs are reported less daily. Pt is incontinent of bowel & bladder (B&B)    Pt is eating and is self-fed. His appetite is fair and nutritional intake is inadequate. Pt will not eat if served a disliked dish. A conversation occurred with available staff regarding a meal replacement (ex. soup, sandwich) during these times. No reported nausea/vomiting. Pt's son has requested that pt eat in the dining room as he seems to eat better. Hospice Aide on site, pt in agreement and is showered during this visit. No noted skin concerns.      Pt is chairfast, uses a wheelchair for mobility and pt is

## 2023-10-03 ENCOUNTER — HOME CARE VISIT (OUTPATIENT)
Dept: SCHEDULING | Facility: HOME HEALTH | Age: 88
End: 2023-10-03
Payer: MEDICARE

## 2023-10-03 PROCEDURE — G0156 HHCP-SVS OF AIDE,EA 15 MIN: HCPCS

## 2023-10-03 PROCEDURE — G0299 HHS/HOSPICE OF RN EA 15 MIN: HCPCS

## 2023-10-03 ASSESSMENT — ENCOUNTER SYMPTOMS: HEMOPTYSIS: 0

## 2023-10-05 ENCOUNTER — HOME CARE VISIT (OUTPATIENT)
Dept: SCHEDULING | Facility: HOME HEALTH | Age: 88
End: 2023-10-05
Payer: MEDICARE

## 2023-10-05 PROCEDURE — G0156 HHCP-SVS OF AIDE,EA 15 MIN: HCPCS

## 2023-10-06 VITALS — RESPIRATION RATE: 16 BRPM | HEART RATE: 59 BPM | DIASTOLIC BLOOD PRESSURE: 73 MMHG | SYSTOLIC BLOOD PRESSURE: 117 MMHG

## 2023-10-06 ASSESSMENT — ENCOUNTER SYMPTOMS
BOWEL INCONTINENCE: 1
STOOL DESCRIPTION: FORMED

## 2023-10-06 NOTE — HOSPICE
will review medications weekly. The hospice Skilled Nurse (SN) coordinates/collaborates pt's care between facility staff and hospice team during pt visits and during hospice interdisciplinary team biweekly meetings (IDG). Coordination/Collaboration of pt's care is implemented with facility staff who are advised to contact [address]) [phone] regarding any pt concerns, fall(s) and/or any change in pt's condition. Facility staff voices an understanding regarding call hospice first, not 911.

## 2023-10-10 ENCOUNTER — HOME CARE VISIT (OUTPATIENT)
Dept: SCHEDULING | Facility: HOME HEALTH | Age: 88
End: 2023-10-10
Payer: MEDICARE

## 2023-10-10 PROCEDURE — G0156 HHCP-SVS OF AIDE,EA 15 MIN: HCPCS

## 2023-10-12 ENCOUNTER — HOME CARE VISIT (OUTPATIENT)
Dept: SCHEDULING | Facility: HOME HEALTH | Age: 88
End: 2023-10-12
Payer: MEDICARE

## 2023-10-12 VITALS
DIASTOLIC BLOOD PRESSURE: 66 MMHG | TEMPERATURE: 97.9 F | HEART RATE: 70 BPM | OXYGEN SATURATION: 97 % | RESPIRATION RATE: 16 BRPM | SYSTOLIC BLOOD PRESSURE: 138 MMHG

## 2023-10-12 PROCEDURE — G0299 HHS/HOSPICE OF RN EA 15 MIN: HCPCS

## 2023-10-12 PROCEDURE — G0156 HHCP-SVS OF AIDE,EA 15 MIN: HCPCS

## 2023-10-12 ASSESSMENT — ENCOUNTER SYMPTOMS: BOWEL INCONTINENCE: 1

## 2023-10-12 NOTE — HOSPICE
Routine visit made. Riky Schilling was seated in his room in his wheelchair with his wife Glo Garcia in the room with him. He had just finished getting his bath and change of clothes. GRANT Young reported that Riky Schilling was agitated during his bath and was difficult to bathe and change clothes. RN spoke with staff member Yomaira Fine and reviewed new order for Depakote medication from yesterday and reviewed his other medication needs. No PRN medications were used this morning. staff voiced no other concerns at this time. Assessment and vitals as charted. Riky Schilling was pleasantly confused during visit alert only to self, allowed RN to take vitals and assess. Riky Schilling continues to have bilateral lower extremity edema, remains incontinent. Annette Tolliver staff aware to call [address] [place] [address] at anytime for any questions, concerns or changes.

## 2023-10-17 ENCOUNTER — HOME CARE VISIT (OUTPATIENT)
Dept: SCHEDULING | Facility: HOME HEALTH | Age: 88
End: 2023-10-17
Payer: MEDICARE

## 2023-10-17 PROCEDURE — G0156 HHCP-SVS OF AIDE,EA 15 MIN: HCPCS

## 2023-10-18 ENCOUNTER — HOME CARE VISIT (OUTPATIENT)
Dept: SCHEDULING | Facility: HOME HEALTH | Age: 88
End: 2023-10-18
Payer: MEDICARE

## 2023-10-18 PROCEDURE — G0299 HHS/HOSPICE OF RN EA 15 MIN: HCPCS

## 2023-10-19 ENCOUNTER — HOME CARE VISIT (OUTPATIENT)
Dept: SCHEDULING | Facility: HOME HEALTH | Age: 88
End: 2023-10-19
Payer: MEDICARE

## 2023-10-19 PROCEDURE — G0156 HHCP-SVS OF AIDE,EA 15 MIN: HCPCS

## 2023-10-23 VITALS — RESPIRATION RATE: 16 BRPM | SYSTOLIC BLOOD PRESSURE: 111 MMHG | HEART RATE: 78 BPM | DIASTOLIC BLOOD PRESSURE: 72 MMHG

## 2023-10-23 ASSESSMENT — ENCOUNTER SYMPTOMS
BOWEL INCONTINENCE: 1
STOOL DESCRIPTION: FORMED

## 2023-10-24 ENCOUNTER — HOME CARE VISIT (OUTPATIENT)
Dept: SCHEDULING | Facility: HOME HEALTH | Age: 88
End: 2023-10-24
Payer: MEDICARE

## 2023-10-24 PROCEDURE — G0156 HHCP-SVS OF AIDE,EA 15 MIN: HCPCS

## 2023-10-24 NOTE — HOSPICE
Ed was dressed in shorts with a huddy on sitting in his WC beside his wife, Elizabeth Joseph. He was alert, verbal (one or two words clear, the rest non-sensical and not understandable) appearing comfortable with no pain level expressed or observed. He participated in visit and spoke of golf, his wife, and family. Armando Rivas, son, arrived during visit and participated in conversation. Ed stayed in the room with his wife instead of going out with other residents for a roll/stroll around the facility. Ed's legs appeared dry and the left leg was slightly more red than the right one. Some confusion noted in conversation. He lit up when speaking about golf, enjoying the game, playing for fun, and teaching his son to play.  contacted and spoke with Samuel Alvarez, and updated her on the visit.  provided spiritual care through pastoral conversation, active listening, supportive responses, scripture reading (???) and prayer. Next regular scheduled visit is set for Wednesday, November 15 btw 12/2.

## 2023-10-25 ENCOUNTER — HOME CARE VISIT (OUTPATIENT)
Dept: SCHEDULING | Facility: HOME HEALTH | Age: 88
End: 2023-10-25
Payer: MEDICARE

## 2023-10-25 VITALS
SYSTOLIC BLOOD PRESSURE: 122 MMHG | OXYGEN SATURATION: 98 % | HEART RATE: 84 BPM | DIASTOLIC BLOOD PRESSURE: 68 MMHG | TEMPERATURE: 98.1 F | RESPIRATION RATE: 16 BRPM

## 2023-10-25 PROBLEM — Z51.5 HOSPICE CARE: Status: ACTIVE | Noted: 2023-10-25

## 2023-10-25 PROBLEM — R13.12 DYSPHAGIA, OROPHARYNGEAL PHASE: Status: ACTIVE | Noted: 2022-06-01

## 2023-10-25 PROBLEM — I63.9 CEREBROVASCULAR ACCIDENT (HCC): Status: ACTIVE | Noted: 2022-08-06

## 2023-10-25 PROBLEM — E44.0 MODERATE PROTEIN-CALORIE MALNUTRITION (WEIGHT FOR AGE 60-74% OF STANDARD) (HCC): Status: ACTIVE | Noted: 2022-07-19

## 2023-10-25 PROCEDURE — G0299 HHS/HOSPICE OF RN EA 15 MIN: HCPCS

## 2023-10-25 ASSESSMENT — ENCOUNTER SYMPTOMS: BOWEL INCONTINENCE: 1

## 2023-10-25 NOTE — HOSPICE
Angelo Montana was seen in his room seated in his wheelchair with his wife Petar Waddell by his side. He was awake, alert and oriented to self only. No complaints or non-verbal signs of pain or shortness of breath noted. Vitals and assessment as charted. Lung sounds diminished and clear. During the visit Angelo Montana was assisted by facility staff to the bathroom and had a bowel movement. Bilateral lower extremity edema noted. Angelo Montana was very pleasant during visit and would answer yes or no to RN's questions. No behavioral disturbances reported by facility. RN updated Corky's son Mariluz Villasenor via phone during visit. facility staff had no concerns at this time. Facility aware to call [facility] at anytime for any questions, concerns or changes.

## 2023-10-26 ENCOUNTER — HOME CARE VISIT (OUTPATIENT)
Dept: SCHEDULING | Facility: HOME HEALTH | Age: 88
End: 2023-10-26
Payer: MEDICARE

## 2023-10-26 PROCEDURE — G0156 HHCP-SVS OF AIDE,EA 15 MIN: HCPCS

## 2023-10-27 ENCOUNTER — HOME CARE VISIT (OUTPATIENT)
Dept: HOSPICE | Facility: HOSPICE | Age: 88
End: 2023-10-27
Payer: MEDICARE

## 2023-10-27 ASSESSMENT — ENCOUNTER SYMPTOMS: HEMOPTYSIS: 0

## 2023-10-30 ENCOUNTER — HOME CARE VISIT (OUTPATIENT)
Dept: SCHEDULING | Facility: HOME HEALTH | Age: 88
End: 2023-10-30
Payer: MEDICARE

## 2023-10-30 PROCEDURE — G0155 HHCP-SVS OF CSW,EA 15 MIN: HCPCS

## 2023-10-31 ENCOUNTER — HOME CARE VISIT (OUTPATIENT)
Dept: SCHEDULING | Facility: HOME HEALTH | Age: 88
End: 2023-10-31
Payer: MEDICARE

## 2023-10-31 PROCEDURE — G0156 HHCP-SVS OF AIDE,EA 15 MIN: HCPCS

## 2023-11-01 ENCOUNTER — HOME CARE VISIT (OUTPATIENT)
Dept: SCHEDULING | Facility: HOME HEALTH | Age: 88
End: 2023-11-01
Payer: MEDICARE

## 2023-11-01 VITALS
RESPIRATION RATE: 20 BRPM | TEMPERATURE: 97.7 F | SYSTOLIC BLOOD PRESSURE: 107 MMHG | DIASTOLIC BLOOD PRESSURE: 58 MMHG | HEART RATE: 70 BPM

## 2023-11-01 PROCEDURE — G0299 HHS/HOSPICE OF RN EA 15 MIN: HCPCS

## 2023-11-01 ASSESSMENT — ENCOUNTER SYMPTOMS: BOWEL INCONTINENCE: 1

## 2023-11-01 NOTE — HOSPICE
Pt presents as aaox1 with calm affect and is sitting in WC. Pt does not indicate pain, discomfort, or agitation and denies any needs at this time. No concerns voiced by facility staff.

## 2023-11-01 NOTE — HOSPICE
Routine visit conducted today. Present for visit are patient, patient's wife, and RN. Ed and his wife in their apartment. He is sitting in his wheelchair leaning to his right side upon arrival. Ed dosing intermittently during visit. He responds to his name and agrees his birthday is 3/12/1927. He has shorts on and when asked if he is cold he states yes and that his knees are bothering him. He stutters and is incomrehensible when trying to describe pain, but he rubs his knees often wendy says. Delfino Favre tells RN that he eats well and even eats her left overs at times. His BLE are a maroon color with BLE edema nonpitting. He denies pain in LE. He sneezes during visit, but denies cough. RN lets med tech at [facility] know that Ed is having Bilateral knee pain and she states she will give him some tylenol. Delfino Favre believes Ed's last BM was last night, but is unsure. Bowel sounds active upon assessment. Liz Aguilar, and facility reminded to call [facility] with questions or concerns regarding Ed.

## 2023-11-02 ENCOUNTER — HOME CARE VISIT (OUTPATIENT)
Dept: SCHEDULING | Facility: HOME HEALTH | Age: 88
End: 2023-11-02
Payer: MEDICARE

## 2023-11-02 PROCEDURE — G0156 HHCP-SVS OF AIDE,EA 15 MIN: HCPCS

## 2023-11-04 ENCOUNTER — HOME CARE VISIT (OUTPATIENT)
Dept: HOSPICE | Facility: HOSPICE | Age: 88
End: 2023-11-04
Payer: MEDICARE

## 2023-11-06 ENCOUNTER — HOME CARE VISIT (OUTPATIENT)
Dept: HOSPICE | Facility: HOSPICE | Age: 88
End: 2023-11-06
Payer: MEDICARE

## 2023-11-06 PROCEDURE — G0299 HHS/HOSPICE OF RN EA 15 MIN: HCPCS

## 2023-11-07 ENCOUNTER — HOME CARE VISIT (OUTPATIENT)
Dept: SCHEDULING | Facility: HOME HEALTH | Age: 88
End: 2023-11-07
Payer: MEDICARE

## 2023-11-07 PROCEDURE — G0156 HHCP-SVS OF AIDE,EA 15 MIN: HCPCS

## 2023-11-09 ENCOUNTER — HOME CARE VISIT (OUTPATIENT)
Dept: SCHEDULING | Facility: HOME HEALTH | Age: 88
End: 2023-11-09
Payer: MEDICARE

## 2023-11-09 PROCEDURE — G0156 HHCP-SVS OF AIDE,EA 15 MIN: HCPCS

## 2023-11-14 ENCOUNTER — HOME CARE VISIT (OUTPATIENT)
Dept: SCHEDULING | Facility: HOME HEALTH | Age: 88
End: 2023-11-14
Payer: MEDICARE

## 2023-11-14 PROCEDURE — G0156 HHCP-SVS OF AIDE,EA 15 MIN: HCPCS

## 2023-11-14 NOTE — HOSPICE
Ed arrived to the apartment shortly after the  arrived and was talking with his wife, Loli Carrillo. He was alert, minimally verbal, and mobile with assistance of his WC. Darryn Zabala was talking, Ed would add some and respond appropriately to what she was saying. He appeared comfortable with no pain level expressed or observed. He was dressed and had a few bits of food on his lap from lunch. Both had eaten lunch and were rolling around the unit after eating. Ed was slower getting back to the apartment and according to Loli Carrillo, he likes to greet people and talk where she likes to come on home. Brielle and Ed spoke of growing up during the [place] Depression and how difficult it was during that time.  provided spiritual care through pastoral conversation, active listening, supportive responses, and prayer. Next regular scheduled visit is set for Thursday, December 14 btw 12/1.  contacted Cande Donahue, son, and updated him on visit.

## 2023-11-16 ENCOUNTER — HOME CARE VISIT (OUTPATIENT)
Dept: SCHEDULING | Facility: HOME HEALTH | Age: 88
End: 2023-11-16
Payer: MEDICARE

## 2023-11-16 VITALS — HEART RATE: 78 BPM | RESPIRATION RATE: 16 BRPM | SYSTOLIC BLOOD PRESSURE: 116 MMHG | DIASTOLIC BLOOD PRESSURE: 74 MMHG

## 2023-11-16 PROCEDURE — G0156 HHCP-SVS OF AIDE,EA 15 MIN: HCPCS

## 2023-11-16 ASSESSMENT — ENCOUNTER SYMPTOMS: BOWEL INCONTINENCE: 1

## 2023-11-16 NOTE — HOSPICE
a 79yo with a hospice diagnosis of Alzheimer's disease with behavioral disturbance        *On arrival, pt is in his apartment with his spouse with whom he shares this apartment. The couple seems to enjoy each others        SUMMARY    Code status: DNR  *Mental Status (MS)/Level of consciousness (LOC): On admission (9/13/2023, Pt is alert and fully oriented to self. Pt can sometimes construct an appropriate phrase as portions of his speech is comprehensible. The majority of the time when he is communicating his speech mainly consist of receptive aphasia (1-3 words may be understood and within context). Pt is not completely able to make needs known and must be monitored for needs. *Palliative Performance Scale (PPS): On admission (9/13/2023, PPS =40%. *Appetite/Nutritional Intake: On admission (9/13/2023), pt is self-fed, his appetite is fair and nutritional intake is inadequate. No reported nausea/vomiting and no swallowing concerns. *(10/3/23) Pt continues to eat and is self-fed. His appetite is fair and nutritional intake is inadequate. No reported nausea/vomiting. Pt and spouse have been eating meals in their shared apartment and are not eating well. Son Adrianne Houser) has requested that pt eat in the dining room as he seems to eat better. *Weight-loss/Mid Upper Arm Circumference (MUAC): On admission (9/13/2023, Left & Right MUAC = 29cm. *10/18/23 =27cm *11/6/23- Left MUAC =26.5cm (slightly decreased)    *Musculoskeletal/Mobility: On admission (9/13/2023), pt is chairfast, wheelchair for mobility and assist with transfers (pt is able to bear weight). *10/11/23- Facility staff reports pt will occasionally attempt to stand up from his wheelchair. +1 non-pitting edema noted in bilateral lower extremities. *Activities of daily Living (ADLs): Pt and spouse eat at least two meals in the dining room. *Fall: No fall since Start of Care.  *MAHC-10 Fall Risk Assessment score: 8    *Fall/Education: [facility]

## 2023-11-17 ENCOUNTER — HOME CARE VISIT (OUTPATIENT)
Dept: SCHEDULING | Facility: HOME HEALTH | Age: 88
End: 2023-11-17
Payer: MEDICARE

## 2023-11-17 VITALS
HEART RATE: 78 BPM | DIASTOLIC BLOOD PRESSURE: 58 MMHG | SYSTOLIC BLOOD PRESSURE: 138 MMHG | RESPIRATION RATE: 16 BRPM | TEMPERATURE: 97.8 F

## 2023-11-17 PROCEDURE — G0155 HHCP-SVS OF CSW,EA 15 MIN: HCPCS

## 2023-11-17 PROCEDURE — G0299 HHS/HOSPICE OF RN EA 15 MIN: HCPCS

## 2023-11-17 ASSESSMENT — ENCOUNTER SYMPTOMS: BOWEL INCONTINENCE: 1

## 2023-11-17 NOTE — HOSPICE
Routine visit made today. Russell Cortes was seen in his room with his wife Dimas Oliva. He was awake, alert and seated in his wheelchair. Russell Cortes was oriented to self only which is his baseline. Vitals and assessment as charted. No signs of pain or distress noted during the visit. Russell Cortes spoke in mumbled and incomprehensible sentences, but he was able to identify his wife and son's name. RN spoke with facility staff [facility] and  April during visit. No behavorial disturbances have been reported and no supply needs at this time. Facility did request refill for ABH gel, RN notified Dr. Leanna Beaver and he will fax prescription to facility and pharmacy. RN updated Corky's son Edith Tom via phone, he had no concerns or needs at this time. RN instructed facility and Edith Tom to call [facility] at any time for any questions, concerns or needs.

## 2023-11-21 ENCOUNTER — HOME CARE VISIT (OUTPATIENT)
Dept: HOSPICE | Facility: HOSPICE | Age: 88
End: 2023-11-21
Payer: MEDICARE

## 2023-11-21 ENCOUNTER — HOME CARE VISIT (OUTPATIENT)
Dept: SCHEDULING | Facility: HOME HEALTH | Age: 88
End: 2023-11-21
Payer: MEDICARE

## 2023-11-21 PROCEDURE — G0299 HHS/HOSPICE OF RN EA 15 MIN: HCPCS

## 2023-11-21 PROCEDURE — G0156 HHCP-SVS OF AIDE,EA 15 MIN: HCPCS

## 2023-11-21 NOTE — HOSPICE
Pt in room sitting in WC. Pt is aaox1 with calm affect. Pt is able to answer simple questions, but is otherwise minimally engaged. Pt does not indicate pain or disocmfort at this time. No needs voiced by facility staff.

## 2023-11-23 ENCOUNTER — HOME CARE VISIT (OUTPATIENT)
Dept: HOSPICE | Facility: HOSPICE | Age: 88
End: 2023-11-23
Payer: MEDICARE

## 2023-11-28 ENCOUNTER — HOME CARE VISIT (OUTPATIENT)
Dept: SCHEDULING | Facility: HOME HEALTH | Age: 88
End: 2023-11-28
Payer: MEDICARE

## 2023-11-28 PROCEDURE — G0156 HHCP-SVS OF AIDE,EA 15 MIN: HCPCS

## 2023-11-28 PROCEDURE — G0299 HHS/HOSPICE OF RN EA 15 MIN: HCPCS

## 2023-11-29 VITALS — DIASTOLIC BLOOD PRESSURE: 59 MMHG | HEART RATE: 63 BPM | RESPIRATION RATE: 16 BRPM | SYSTOLIC BLOOD PRESSURE: 113 MMHG

## 2023-11-29 ASSESSMENT — ENCOUNTER SYMPTOMS
STOOL DESCRIPTION: FORMED
BOWEL INCONTINENCE: 1

## 2023-11-30 ENCOUNTER — HOME CARE VISIT (OUTPATIENT)
Dept: SCHEDULING | Facility: HOME HEALTH | Age: 88
End: 2023-11-30
Payer: MEDICARE

## 2023-11-30 PROCEDURE — G0156 HHCP-SVS OF AIDE,EA 15 MIN: HCPCS

## 2023-12-02 ENCOUNTER — HOME CARE VISIT (OUTPATIENT)
Dept: HOSPICE | Facility: HOSPICE | Age: 88
End: 2023-12-02
Payer: MEDICARE

## 2023-12-02 VITALS
SYSTOLIC BLOOD PRESSURE: 113 MMHG | RESPIRATION RATE: 16 BRPM | DIASTOLIC BLOOD PRESSURE: 77 MMHG | HEART RATE: 74 BPM | TEMPERATURE: 98.4 F

## 2023-12-02 PROCEDURE — G0299 HHS/HOSPICE OF RN EA 15 MIN: HCPCS

## 2023-12-02 ASSESSMENT — ENCOUNTER SYMPTOMS
HEMOPTYSIS: 0
CONTUSION: 1

## 2023-12-04 ENCOUNTER — HOME CARE VISIT (OUTPATIENT)
Dept: HOSPICE | Facility: HOSPICE | Age: 88
End: 2023-12-04
Payer: MEDICARE

## 2023-12-04 PROCEDURE — G0299 HHS/HOSPICE OF RN EA 15 MIN: HCPCS

## 2023-12-05 ENCOUNTER — HOME CARE VISIT (OUTPATIENT)
Dept: SCHEDULING | Facility: HOME HEALTH | Age: 88
End: 2023-12-05
Payer: MEDICARE

## 2023-12-05 PROCEDURE — G0156 HHCP-SVS OF AIDE,EA 15 MIN: HCPCS

## 2023-12-05 NOTE — HOSPICE
Ed was dressed in shorts, mobile via [employer], and was at a dining table with his wife and several other residents eating lunch. Ed was alert, verbal, and appearing comfortable with no pain level expressed or observed. He denied any pain. Feeding self. Appeared to recognize the . He was attentive to MÜHLBACH, wife, and was observed, helping to clean his area of the table after eating. No needs or concerns shared. Open to prayers.  provided spiritual care through pastoral conversation, active listening, supportive responses,  and prayer. Next regular scheduled visit is set for Thursday, January 11 btw 11/1.  contacted Bridget Kimble, son, and left message updating him on visit.

## 2023-12-06 VITALS — RESPIRATION RATE: 16 BRPM | HEART RATE: 68 BPM | SYSTOLIC BLOOD PRESSURE: 118 MMHG | DIASTOLIC BLOOD PRESSURE: 62 MMHG

## 2023-12-06 ASSESSMENT — ENCOUNTER SYMPTOMS
STOOL DESCRIPTION: FORMED
BOWEL INCONTINENCE: 1

## 2023-12-06 NOTE — HOSPICE
personal care (shower), pt is explained the intended procedure pt in agreement and is showered during this visit. On 10/11/23, Per the HHA, while attempting to perform personal care to pt and spouse. Pt had a behavioral episode in which he became verbally aggressive and threatened to be physical. A dosage adjustment initiated for (Depakote 250mg: One po TID- Anxious Behaviors, Agitation. *10/18/23- No further behaviors since last medication (Depakote 250mg) adjustment. *Pt resides at [address] [place] [address] with his spouse. Coordination/Collaboration of pt's care occurs in various ways with the hospice Skilled Nurse (SN) and hospice care team via pt visits, the facility staff, pt's facility care plan meetings and during the hospice interdisciplinary team's biweekly meetings (IDG). Hospice drives pt's care, therefore; the facility staff are advised to contact [address]) [phone] regarding any pt concerns, fall(s) and/or any change in pt's condition. Facility staff voices an understanding regarding call hospice first, not 911.

## 2023-12-07 ENCOUNTER — HOME CARE VISIT (OUTPATIENT)
Dept: SCHEDULING | Facility: HOME HEALTH | Age: 88
End: 2023-12-07
Payer: MEDICARE

## 2023-12-07 PROCEDURE — G0156 HHCP-SVS OF AIDE,EA 15 MIN: HCPCS

## 2023-12-10 VITALS — SYSTOLIC BLOOD PRESSURE: 118 MMHG | HEART RATE: 69 BPM | DIASTOLIC BLOOD PRESSURE: 73 MMHG

## 2023-12-10 ASSESSMENT — ENCOUNTER SYMPTOMS
BOWEL INCONTINENCE: 1
STOOL DESCRIPTION: FORMED

## 2023-12-11 ENCOUNTER — HOME CARE VISIT (OUTPATIENT)
Dept: HOSPICE | Facility: HOSPICE | Age: 88
End: 2023-12-11
Payer: MEDICARE

## 2023-12-11 VITALS
SYSTOLIC BLOOD PRESSURE: 110 MMHG | HEART RATE: 72 BPM | DIASTOLIC BLOOD PRESSURE: 66 MMHG | TEMPERATURE: 99.1 F | OXYGEN SATURATION: 96 % | RESPIRATION RATE: 12 BRPM

## 2023-12-11 PROCEDURE — G0299 HHS/HOSPICE OF RN EA 15 MIN: HCPCS

## 2023-12-11 ASSESSMENT — ENCOUNTER SYMPTOMS: BOWEL INCONTINENCE: 1

## 2023-12-11 NOTE — HOSPICE
SN visit in patient room - patient sitting up in wc - dressed fro day - wife sitting beside him in her wc. Patient dozing when sn arrived. Patient initially unable to answer sn questions - slightly garbled speech. However, after a short time, sn able to get patient to talk about his \" courtship\" time with his wife. Patient smiled when he told sn he med wife dancing \" and she's a good dancer. 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 Patient unable to answer sn questions re what is going on with him at this time. VSS. Patient denies any pain - no s/s pain noted. Patient very cooperative with sn. Wife reports she and  eat meals outside of room - in unit dining room - reporting \" he eats good. \"  Pt wearing depends - inc b/b per baseline. SN reviewed patient's status with patient's ERIK nurse/med tech and pca for the day - both report patient doing well - has been compliant with showers, eating well and taking meds without problems. SN noted new supplies - depends, wipes, etc had arrived - full box in room - pca reporting she will be \" putting up\" after sn visit. SN reminded jail staff to call [facility] for any questions or concerns - both staff verbalized good understanding.

## 2023-12-12 ENCOUNTER — HOME CARE VISIT (OUTPATIENT)
Dept: SCHEDULING | Facility: HOME HEALTH | Age: 88
End: 2023-12-12
Payer: MEDICARE

## 2023-12-12 PROCEDURE — G0156 HHCP-SVS OF AIDE,EA 15 MIN: HCPCS

## 2023-12-14 ENCOUNTER — HOME CARE VISIT (OUTPATIENT)
Dept: SCHEDULING | Facility: HOME HEALTH | Age: 88
End: 2023-12-14
Payer: MEDICARE

## 2023-12-14 PROCEDURE — G0155 HHCP-SVS OF CSW,EA 15 MIN: HCPCS

## 2023-12-15 NOTE — HOSPICE
Pt and spouse found in dining room having dinner. Pt presents as aaox1 with calm affect and is enjoying a cupcake. Pt denies pain or discomfort at this time. No needs voiced or indicated by pt or facility staff.

## 2023-12-20 ENCOUNTER — HOME CARE VISIT (OUTPATIENT)
Dept: HOSPICE | Facility: HOSPICE | Age: 88
End: 2023-12-20
Payer: MEDICARE

## 2023-12-20 PROCEDURE — G0299 HHS/HOSPICE OF RN EA 15 MIN: HCPCS

## 2023-12-26 ENCOUNTER — HOME CARE VISIT (OUTPATIENT)
Dept: SCHEDULING | Facility: HOME HEALTH | Age: 88
End: 2023-12-26
Payer: MEDICARE

## 2023-12-26 PROCEDURE — G0156 HHCP-SVS OF AIDE,EA 15 MIN: HCPCS

## 2023-12-27 ENCOUNTER — HOME CARE VISIT (OUTPATIENT)
Dept: HOSPICE | Facility: HOSPICE | Age: 88
End: 2023-12-27
Payer: MEDICARE

## 2023-12-27 PROCEDURE — G0299 HHS/HOSPICE OF RN EA 15 MIN: HCPCS

## 2023-12-28 ENCOUNTER — HOME CARE VISIT (OUTPATIENT)
Dept: SCHEDULING | Facility: HOME HEALTH | Age: 88
End: 2023-12-28
Payer: MEDICARE

## 2023-12-28 PROCEDURE — G0156 HHCP-SVS OF AIDE,EA 15 MIN: HCPCS

## 2024-01-02 ENCOUNTER — HOME CARE VISIT (OUTPATIENT)
Dept: SCHEDULING | Facility: HOME HEALTH | Age: 89
End: 2024-01-02
Payer: MEDICARE

## 2024-01-02 VITALS — RESPIRATION RATE: 16 BRPM | HEART RATE: 68 BPM | DIASTOLIC BLOOD PRESSURE: 62 MMHG | SYSTOLIC BLOOD PRESSURE: 118 MMHG

## 2024-01-02 PROCEDURE — G0156 HHCP-SVS OF AIDE,EA 15 MIN: HCPCS

## 2024-01-02 ASSESSMENT — ENCOUNTER SYMPTOMS
STOOL DESCRIPTION: FORMED
BOWEL INCONTINENCE: 1

## 2024-01-02 NOTE — HOSPICE
staff are advised to contact [facility] ([facility]) [phone] regarding any pt concerns, fall(s) and/or any change in pt's condition. Facility staff voices an understanding regarding call hospice first, not 911.

## 2024-01-04 ENCOUNTER — HOME CARE VISIT (OUTPATIENT)
Dept: SCHEDULING | Facility: HOME HEALTH | Age: 89
End: 2024-01-04
Payer: MEDICARE

## 2024-01-04 VITALS
DIASTOLIC BLOOD PRESSURE: 82 MMHG | TEMPERATURE: 98.4 F | SYSTOLIC BLOOD PRESSURE: 132 MMHG | HEART RATE: 62 BPM | RESPIRATION RATE: 17 BRPM

## 2024-01-04 PROCEDURE — G0156 HHCP-SVS OF AIDE,EA 15 MIN: HCPCS

## 2024-01-04 PROCEDURE — G0299 HHS/HOSPICE OF RN EA 15 MIN: HCPCS

## 2024-01-04 PROCEDURE — G0155 HHCP-SVS OF CSW,EA 15 MIN: HCPCS

## 2024-01-04 ASSESSMENT — ENCOUNTER SYMPTOMS: BOWEL INCONTINENCE: 1

## 2024-01-04 NOTE — HOSPICE
Met with patient Mr Sue at the [facility]. He was in his room with his wife and son sitting in his wheelchair. He was sleeping for most of the visit which apparently is typical. He was oriented x1 and responded to voice. He would respond appropriately when I asked if I could assess him which meant taking his vitals and checking his skin for any wounds, redness or bruising, or edema. BLE +2 edema was noted. Skin intact and without wounds. Patient shows no signs of distress and verbalized that he was not in pain. No concerns at this time. Instructed patients son to contact [facility] with any hospice concerns

## 2024-01-08 NOTE — HOSPICE
Pt and wife found in room, sitting in WCs. Pt presents as aaox1 with flat affect and denies pain or discomfort. Pt does not indicte any needs. Social visit provided.

## 2024-01-09 ENCOUNTER — HOME CARE VISIT (OUTPATIENT)
Dept: SCHEDULING | Facility: HOME HEALTH | Age: 89
End: 2024-01-09
Payer: MEDICARE

## 2024-01-09 ENCOUNTER — HOME CARE VISIT (OUTPATIENT)
Dept: HOSPICE | Facility: HOSPICE | Age: 89
End: 2024-01-09
Payer: MEDICARE

## 2024-01-09 VITALS — HEART RATE: 64 BPM | SYSTOLIC BLOOD PRESSURE: 108 MMHG | RESPIRATION RATE: 16 BRPM | DIASTOLIC BLOOD PRESSURE: 65 MMHG

## 2024-01-09 PROCEDURE — G0156 HHCP-SVS OF AIDE,EA 15 MIN: HCPCS

## 2024-01-09 PROCEDURE — G0299 HHS/HOSPICE OF RN EA 15 MIN: HCPCS

## 2024-01-09 ASSESSMENT — ENCOUNTER SYMPTOMS
STOOL DESCRIPTION: FORMED
BOWEL INCONTINENCE: 1

## 2024-01-09 NOTE — HOSPICE
a 89yo with a hospice diagnosis of Alzheimer's disease with behavioral disturbance        On arrival, pt is roaming about the facility via \"foot walking”. Pt is chairfast and utilizes a wheelchair for mobility. Pt tires easily as he is noted stopping and “resting his eyes” momentarily. On his next start-up” pt responds timely to verbal stimuli via pt presents to recognize this nurse via smiling and reaching out.  Pt is not completely able to make needs known. Pt can sometimes construct an appropriate sentence but mainly has difficulty finding words to express himself. Pt should be monitored for needs.  Pt's Level of consciousness (LOC), orientation level, cognition, speech and body language are all pt's normalcy.        SUMMARY  *Code status: DNR  *Mental Status (MS)/Level of consciousness (LOC): On admission (9/13/2023), Pt is alert and fully oriented to self. Pt can sometimes construct an appropriate phrase as portions of his speech is comprehensible. The majority of the time when he is communicating his speech mainly consist of receptive aphasia (1-3 words may be understood and within context). Pt is not completely able to make needs known and must be monitored for needs.    *Palliative Performance Scale (PPS): On admission (9/13/2023), PPS =40%.    *Appetite/Nutritional Intake: On admission (9/13/2023), pt is self-fed, his appetite is fair and nutritional intake is inadequate. No reported nausea/vomiting and no swallowing concerns.  *10/3/23: Pt continues to eat and is self-fed. His appetite is fair and nutritional intake is inadequate. No reported nausea/vomiting. Pt and spouse have been eating meals in their shared apartment and are not eating well. Son (Terence) has requested that pt eat in the dining room as he seems to eat better.    *Weight-loss/Mid Upper Arm Circumference (MUAC): On admission (9/13/2023), Left & Right MUAC =29cm.   *10/18/23 Left MUAC =27cm,   *11/6/23- Left MUAC =26.5cm (slightly

## 2024-01-09 NOTE — HOSPICE
Ed was dressed, sitting in his WC in his apartment asleep.   spoke his name loudly several times and he did not respond to him.  Breathing was regular.  Ed appeared comfortable with no pain level expressed or observed.  Brielle, wife, was not in the room and  left him there to see if Brielle was in the dinning room.   found her there and while he was visiting with her, staff rolled Ed to the table next to Brielle, he was still asleep.  Staff and  attempted to wake him but he did not waken.  During his nap, he began talking loudly, almost angrily, to where staff came back over to try and wake him.  He responded to our attempt to wake him, said he was ok, and asked him about his conversation with the facility .  Ed said he did and went back to sleep.  Lunch was brought to the table, Brielle, wife, ate hers while Ed remained asleep.   provided spiritual care through pastoral conversation, active listening, supportive responses and prayer.  Next regular scheduled visit is set for Thursday, February 8 btw 11/1.   contacted and spoke with Forest Armstrong, and updated her on the visit.   also contacted and left message with Terence pt's son, and updated him on visit.

## 2024-01-11 ENCOUNTER — HOME CARE VISIT (OUTPATIENT)
Dept: HOSPICE | Facility: HOSPICE | Age: 89
End: 2024-01-11
Payer: MEDICARE

## 2024-01-11 ENCOUNTER — HOME CARE VISIT (OUTPATIENT)
Dept: SCHEDULING | Facility: HOME HEALTH | Age: 89
End: 2024-01-11
Payer: MEDICARE

## 2024-01-11 PROCEDURE — G0155 HHCP-SVS OF CSW,EA 15 MIN: HCPCS

## 2024-01-11 PROCEDURE — G0156 HHCP-SVS OF AIDE,EA 15 MIN: HCPCS

## 2024-01-11 NOTE — HOSPICE
Routine MSW vist with both PT and his wife, Brielle, who reside in same room at [facility].  Pt able to respond to yes/no conversation and social conversation.  He was unable to answer to open ended conversation.  PT's spouse talked most of the visit.  He would agree, smile, etc. during visit.  He denied pain, stated he slept well.  No s/s emotional or behavioral distress noted.  Spouse shared they met at a dance and they loved to dance.  She shared that Pt even would twirl her around on his neck.  She shared PT was the baby in his family.  He had 5 sisters and 3 brothers, per spouse.  Call to [facility] after visit.  No answer, but message left.  Son, Terence, returned MSW call later in the afternoon.  MSW introduced self and offered update on visit.  He shared he had visited yesterday and PT seems to be sleeping a lot more.  PT did close eyes during our visit, but did not appear to fall asleep.  He shared his concern that PT's right eye has been matted up.  SW promised to inform OA RN.  MSW offered ongoing support and enc him to call any time.  After visit, MSW called and spoke with OA RN, Patti Jiménez, and updated on Pt's eye.  MSW will cont to offer monthly and prn visits offering ongoing assmt of psychosoical status and emotional support.

## 2024-01-16 ENCOUNTER — HOME CARE VISIT (OUTPATIENT)
Dept: SCHEDULING | Facility: HOME HEALTH | Age: 89
End: 2024-01-16
Payer: MEDICARE

## 2024-01-16 PROCEDURE — G0156 HHCP-SVS OF AIDE,EA 15 MIN: HCPCS

## 2024-01-17 PROBLEM — I73.9 PERIPHERAL VASCULAR DISEASE, UNSPECIFIED (HCC): Status: ACTIVE | Noted: 2022-06-01

## 2024-01-17 PROBLEM — R41.841 COGNITIVE COMMUNICATION DEFICIT: Status: ACTIVE | Noted: 2022-06-01

## 2024-01-17 PROBLEM — M62.81 MUSCLE WEAKNESS (GENERALIZED): Status: ACTIVE | Noted: 2022-06-01

## 2024-01-17 PROBLEM — F01.518 VASCULAR DEMENTIA WITH BEHAVIORAL DISTURBANCE (HCC): Status: ACTIVE | Noted: 2022-06-01

## 2024-01-18 ENCOUNTER — HOME CARE VISIT (OUTPATIENT)
Dept: SCHEDULING | Facility: HOME HEALTH | Age: 89
End: 2024-01-18
Payer: MEDICARE

## 2024-01-18 ENCOUNTER — HOME CARE VISIT (OUTPATIENT)
Dept: HOSPICE | Facility: HOSPICE | Age: 89
End: 2024-01-18
Payer: MEDICARE

## 2024-01-18 PROCEDURE — G0299 HHS/HOSPICE OF RN EA 15 MIN: HCPCS

## 2024-01-18 PROCEDURE — G0156 HHCP-SVS OF AIDE,EA 15 MIN: HCPCS

## 2024-01-19 VITALS — RESPIRATION RATE: 16 BRPM | DIASTOLIC BLOOD PRESSURE: 74 MMHG | HEART RATE: 66 BPM | SYSTOLIC BLOOD PRESSURE: 115 MMHG

## 2024-01-19 ASSESSMENT — ENCOUNTER SYMPTOMS
BOWEL INCONTINENCE: 1
STOOL DESCRIPTION: FORMED

## 2024-01-19 NOTE — HOSPICE
footwear.      *Medications managed/reviewed: Pt is taking medications as prescribed and medications are managed by [facility] staff. [facility] ([facility]) Skilled Nurse () will review medications weekly.       *Visits:   -Skilled Nurse (SN) visits planned for one (1) visit/week for physical assessment, education, medications, and symptom management.  -Hospice Health Aide (HHA) visits planned for two (2) visits/week for personal care and ADL assist.       *Pt's son (Terence) will be contacted to update on pt's status.      *Pt resides at [facility] with his spouse. Coordination/Collaboration of pt's care occurs in various ways with the hospice Skilled Nurse (SN) and hospice care team via pt visits, the facility staff, pt's facility care plan meetings and during the hospice interdisciplinary team's biweekly meetings (IDG). Hospice drives pt's care, therefore; the facility staff are advised to contact [facility] ([facility]) [phone] regarding any pt concerns, fall(s) and/or any change in pt's condition. Facility staff voices an understanding regarding call hospice first, not 911.

## 2024-01-22 ENCOUNTER — HOME CARE VISIT (OUTPATIENT)
Dept: HOSPICE | Facility: HOSPICE | Age: 89
End: 2024-01-22
Payer: MEDICARE

## 2024-01-23 ENCOUNTER — HOME CARE VISIT (OUTPATIENT)
Dept: SCHEDULING | Facility: HOME HEALTH | Age: 89
End: 2024-01-23
Payer: MEDICARE

## 2024-01-23 PROCEDURE — G0156 HHCP-SVS OF AIDE,EA 15 MIN: HCPCS

## 2024-01-25 ENCOUNTER — HOME CARE VISIT (OUTPATIENT)
Dept: SCHEDULING | Facility: HOME HEALTH | Age: 89
End: 2024-01-25
Payer: MEDICARE

## 2024-01-25 PROCEDURE — G0156 HHCP-SVS OF AIDE,EA 15 MIN: HCPCS

## 2024-01-29 VITALS — HEART RATE: 72 BPM | SYSTOLIC BLOOD PRESSURE: 114 MMHG | DIASTOLIC BLOOD PRESSURE: 68 MMHG | RESPIRATION RATE: 16 BRPM

## 2024-01-29 ASSESSMENT — ENCOUNTER SYMPTOMS
STOOL DESCRIPTION: FORMED
BOWEL INCONTINENCE: 1

## 2024-01-30 ENCOUNTER — HOME CARE VISIT (OUTPATIENT)
Dept: SCHEDULING | Facility: HOME HEALTH | Age: 89
End: 2024-01-30
Payer: MEDICARE

## 2024-01-30 PROCEDURE — G0156 HHCP-SVS OF AIDE,EA 15 MIN: HCPCS

## 2024-02-01 ENCOUNTER — HOME CARE VISIT (OUTPATIENT)
Dept: SCHEDULING | Facility: HOME HEALTH | Age: 89
End: 2024-02-01
Payer: MEDICARE

## 2024-02-01 PROCEDURE — G0156 HHCP-SVS OF AIDE,EA 15 MIN: HCPCS

## 2024-02-01 PROCEDURE — G0299 HHS/HOSPICE OF RN EA 15 MIN: HCPCS

## 2024-02-02 ENCOUNTER — HOME CARE VISIT (OUTPATIENT)
Dept: SCHEDULING | Facility: HOME HEALTH | Age: 89
End: 2024-02-02
Payer: MEDICARE

## 2024-02-02 NOTE — HOSPICE
Ed was dressed and sitting in his WC beside Brielle,wife, who was sitting in her WC.  Joint visit with the two of them.  Ed was awake, alert, verbal, and appeared comfortable with no pain level expressed or observed.  He responded to some of the conversation and would drift off to sleep on and off through the visit.  He would correctly respond so some of the things his wife shared.   provided spiritual care through pastoral conversation, active listening, supportive responses, scripture reading (Ps 34: 1 - 9, 17,18, 22) and prayer.  Next regular scheduled visit is set for Thursday, March 7 btw 10/12.   contacted and left message with Terence, son, updating him on the visit.  A few moments later, Terence called the  back.   and Terence had just missed each other as Terence was then with his parents.   updated him on the visit.  Terence complained that his dad was wearing shorts and his long pants could not be found.

## 2024-02-06 ENCOUNTER — HOME CARE VISIT (OUTPATIENT)
Dept: SCHEDULING | Facility: HOME HEALTH | Age: 89
End: 2024-02-06
Payer: MEDICARE

## 2024-02-06 VITALS — SYSTOLIC BLOOD PRESSURE: 106 MMHG | HEART RATE: 77 BPM | RESPIRATION RATE: 16 BRPM | DIASTOLIC BLOOD PRESSURE: 75 MMHG

## 2024-02-06 PROCEDURE — G0156 HHCP-SVS OF AIDE,EA 15 MIN: HCPCS

## 2024-02-06 ASSESSMENT — ENCOUNTER SYMPTOMS
BOWEL INCONTINENCE: 1
STOOL DESCRIPTION: FORMED

## 2024-02-06 NOTE — HOSPICE
symptom management.  -Hospice Health Aide (HHA) visits planned for two (2) visits/week for personal care and ADL assist.       *Pt's son (Terence) will be contacted to update on pt's status.      *Pt resides at [facility] and shares an apartment with his spouse. Coordination/Collaboration of pt's care occurs in various ways with the hospice Skilled Nurse (SN) and hospice care team via pt visits, the facility staff, pt's facility care plan meetings and during the hospice interdisciplinary team's biweekly meetings (IDG). Hospice drives pt's care, therefore; the facility staff are advised to contact [facility] ([facility]) [phone] regarding any pt concerns, fall(s) and/or any change in pt's condition. Facility staff voices an understanding regarding call hospice first, not 911.

## 2024-02-07 ENCOUNTER — HOME CARE VISIT (OUTPATIENT)
Dept: HOSPICE | Facility: HOSPICE | Age: 89
End: 2024-02-07
Payer: MEDICARE

## 2024-02-07 PROCEDURE — G0155 HHCP-SVS OF CSW,EA 15 MIN: HCPCS

## 2024-02-07 NOTE — HOSPICE
Routine MSW visit with Ed as he and spouse were finishing lunch.  He had not eaten much.  MSW noted his dessert, cut it up into smaller pieces, and he ate almost all of it.  He was minimally verbal, communicating more with facial expression and hand gestures.  He kept his eyes closed at times.  No s/s emotional or behavioral distress noted.  MSW called after visit and left message with Terence; updating him of our visit. He called back after our visit.  MSW offered update of visit and ongoing support.  MSW will cont to offer visits monthly and prn to provide ongoing emotional support and assessment of psychosocial and bereavement concerns and needs.

## 2024-02-08 ENCOUNTER — HOME CARE VISIT (OUTPATIENT)
Dept: SCHEDULING | Facility: HOME HEALTH | Age: 89
End: 2024-02-08
Payer: MEDICARE

## 2024-02-08 PROCEDURE — G0156 HHCP-SVS OF AIDE,EA 15 MIN: HCPCS

## 2024-02-13 ENCOUNTER — HOME CARE VISIT (OUTPATIENT)
Dept: SCHEDULING | Facility: HOME HEALTH | Age: 89
End: 2024-02-13
Payer: MEDICARE

## 2024-02-13 ENCOUNTER — HOME CARE VISIT (OUTPATIENT)
Dept: HOSPICE | Facility: HOSPICE | Age: 89
End: 2024-02-13
Payer: MEDICARE

## 2024-02-13 PROCEDURE — G0156 HHCP-SVS OF AIDE,EA 15 MIN: HCPCS

## 2024-02-13 PROCEDURE — G0299 HHS/HOSPICE OF RN EA 15 MIN: HCPCS

## 2024-02-15 ENCOUNTER — HOME CARE VISIT (OUTPATIENT)
Dept: SCHEDULING | Facility: HOME HEALTH | Age: 89
End: 2024-02-15
Payer: MEDICARE

## 2024-02-15 PROCEDURE — G0156 HHCP-SVS OF AIDE,EA 15 MIN: HCPCS

## 2024-02-20 ENCOUNTER — HOME CARE VISIT (OUTPATIENT)
Dept: SCHEDULING | Facility: HOME HEALTH | Age: 89
End: 2024-02-20
Payer: MEDICARE

## 2024-02-20 PROCEDURE — G0156 HHCP-SVS OF AIDE,EA 15 MIN: HCPCS

## 2024-02-22 ENCOUNTER — HOME CARE VISIT (OUTPATIENT)
Dept: SCHEDULING | Facility: HOME HEALTH | Age: 89
End: 2024-02-22
Payer: MEDICARE

## 2024-02-22 PROCEDURE — G0156 HHCP-SVS OF AIDE,EA 15 MIN: HCPCS

## 2024-02-25 VITALS — HEART RATE: 90 BPM | SYSTOLIC BLOOD PRESSURE: 118 MMHG | DIASTOLIC BLOOD PRESSURE: 74 MMHG | RESPIRATION RATE: 16 BRPM

## 2024-02-27 ENCOUNTER — HOME CARE VISIT (OUTPATIENT)
Dept: SCHEDULING | Facility: HOME HEALTH | Age: 89
End: 2024-02-27
Payer: MEDICARE

## 2024-02-27 PROCEDURE — G0156 HHCP-SVS OF AIDE,EA 15 MIN: HCPCS

## 2024-02-27 PROCEDURE — G0299 HHS/HOSPICE OF RN EA 15 MIN: HCPCS

## 2024-02-29 ENCOUNTER — HOME CARE VISIT (OUTPATIENT)
Dept: SCHEDULING | Facility: HOME HEALTH | Age: 89
End: 2024-02-29
Payer: MEDICARE

## 2024-02-29 PROCEDURE — G0156 HHCP-SVS OF AIDE,EA 15 MIN: HCPCS

## 2024-03-01 VITALS — SYSTOLIC BLOOD PRESSURE: 130 MMHG | RESPIRATION RATE: 18 BRPM | HEART RATE: 96 BPM | DIASTOLIC BLOOD PRESSURE: 80 MMHG

## 2024-03-01 ASSESSMENT — ENCOUNTER SYMPTOMS
BOWEL INCONTINENCE: 1
STOOL DESCRIPTION: FORMED

## 2024-03-01 NOTE — HOSPICE
ways with the hospice Skilled Nurse (SN) and hospice care team via pt visits, the facility staff, pt's facility care plan meetings and during the hospice interdisciplinary team's biweekly meetings (IDG). Hospice drives pt's care, therefore; the facility staff are advised to contact [facility] ([facility]) [phone] regarding any pt concerns, fall(s) and/or any change in pt's condition. Facility staff voices an understanding regarding contacting hospice first, not 911.

## 2024-03-05 ENCOUNTER — HOME CARE VISIT (OUTPATIENT)
Dept: SCHEDULING | Facility: HOME HEALTH | Age: 89
End: 2024-03-05
Payer: MEDICARE

## 2024-03-05 ENCOUNTER — HOME CARE VISIT (OUTPATIENT)
Dept: HOSPICE | Facility: HOSPICE | Age: 89
End: 2024-03-05
Payer: MEDICARE

## 2024-03-05 PROCEDURE — G0156 HHCP-SVS OF AIDE,EA 15 MIN: HCPCS

## 2024-03-05 PROCEDURE — G0155 HHCP-SVS OF CSW,EA 15 MIN: HCPCS

## 2024-03-05 NOTE — HOSPICE
Joint visit with his wife, Brielle.  Ed was dressed, sitting in his wc, and eating lunch in the dining maravilla.  Ed was alert, verbal (difficult to understand), and appeared comfortable with no pain level expressed or observed.   tried to engage him in conversation as it was difficult to understand.  As he was finishing up, a facility staffer came to the table and began wiping the table.  She acknowledged Ed, got his attention, waved at him, blew him a kiss using her hand from her lips sending the kiss to him and told him she loved him.  Staffer did not acknowledge his wife sitting to his left nor the  kneeling between them.  Brielle saw the whole encounter.   continued to attempt conversation to no avail.   provided spiritual care through pastoral conversation, active listening, supportive responses, scripture reading (27: 1 - 5) and prayer.  Next regular scheduled visit is set for Thursday, April 4 btw 10/12.   contacted Terence, son, and left a message updating him on the visit.   also contacted and spoke with KATIE Armstrong, updating her on visit.

## 2024-03-05 NOTE — HOSPICE
Routine monthly MSW visit with \"Ed\" this date.  MSW visited in room with his wife prior to visit.  PT sound asleep during entire visit except when he aroused briefly to answer to brief socialization.  His breakfast tray was at bedside, untouched.  Brielle (his spouse), shared staff had tried to work with him this am.  She heard him yelling and she went to check on him.  She said staff told her that he resists care, and everything was fine.  Per collaboration, Ed does have Escitalopram and Depakote scheduled, with order for PRN Trazadone.  During visit, Ed appeared calm and comfortable, did not exhibit any s/s pain or distress.  After visit, MSW attempted to reach his son, Terence.  No answer, but message left offering update and ongoing support.  SW will continue to provide emotional support and assessment of psychosocial and bereavement concerns and needs.

## 2024-03-07 ENCOUNTER — HOME CARE VISIT (OUTPATIENT)
Dept: SCHEDULING | Facility: HOME HEALTH | Age: 89
End: 2024-03-07
Payer: MEDICARE

## 2024-03-07 PROCEDURE — G0156 HHCP-SVS OF AIDE,EA 15 MIN: HCPCS

## 2024-03-12 ENCOUNTER — HOME CARE VISIT (OUTPATIENT)
Dept: SCHEDULING | Facility: HOME HEALTH | Age: 89
End: 2024-03-12
Payer: MEDICARE

## 2024-03-12 PROCEDURE — G0299 HHS/HOSPICE OF RN EA 15 MIN: HCPCS

## 2024-03-12 PROCEDURE — G0156 HHCP-SVS OF AIDE,EA 15 MIN: HCPCS

## 2024-03-14 ENCOUNTER — HOME CARE VISIT (OUTPATIENT)
Dept: SCHEDULING | Facility: HOME HEALTH | Age: 89
End: 2024-03-14
Payer: MEDICARE

## 2024-03-14 PROCEDURE — G0156 HHCP-SVS OF AIDE,EA 15 MIN: HCPCS

## 2024-03-18 ENCOUNTER — HOME CARE VISIT (OUTPATIENT)
Dept: SCHEDULING | Facility: HOME HEALTH | Age: 89
End: 2024-03-18
Payer: MEDICARE

## 2024-03-18 PROCEDURE — G0299 HHS/HOSPICE OF RN EA 15 MIN: HCPCS

## 2024-03-19 ENCOUNTER — HOME CARE VISIT (OUTPATIENT)
Dept: SCHEDULING | Facility: HOME HEALTH | Age: 89
End: 2024-03-19
Payer: MEDICARE

## 2024-03-21 ENCOUNTER — HOME CARE VISIT (OUTPATIENT)
Dept: SCHEDULING | Facility: HOME HEALTH | Age: 89
End: 2024-03-21
Payer: MEDICARE

## 2024-03-21 PROCEDURE — G0156 HHCP-SVS OF AIDE,EA 15 MIN: HCPCS

## 2024-03-25 ENCOUNTER — HOME CARE VISIT (OUTPATIENT)
Dept: SCHEDULING | Facility: HOME HEALTH | Age: 89
End: 2024-03-25
Payer: MEDICARE

## 2024-03-25 PROCEDURE — G0299 HHS/HOSPICE OF RN EA 15 MIN: HCPCS

## 2024-03-26 ENCOUNTER — HOME CARE VISIT (OUTPATIENT)
Dept: SCHEDULING | Facility: HOME HEALTH | Age: 89
End: 2024-03-26
Payer: MEDICARE

## 2024-03-26 PROCEDURE — G0156 HHCP-SVS OF AIDE,EA 15 MIN: HCPCS

## 2024-03-28 ENCOUNTER — HOME CARE VISIT (OUTPATIENT)
Dept: SCHEDULING | Facility: HOME HEALTH | Age: 89
End: 2024-03-28
Payer: MEDICARE

## 2024-03-28 PROCEDURE — G0156 HHCP-SVS OF AIDE,EA 15 MIN: HCPCS

## 2024-03-31 ENCOUNTER — HOME CARE VISIT (OUTPATIENT)
Dept: HOSPICE | Facility: HOSPICE | Age: 89
End: 2024-03-31
Payer: MEDICARE

## 2024-04-01 ENCOUNTER — HOME CARE VISIT (OUTPATIENT)
Dept: HOSPICE | Facility: HOSPICE | Age: 89
End: 2024-04-01
Payer: MEDICARE

## 2024-04-01 PROCEDURE — G0299 HHS/HOSPICE OF RN EA 15 MIN: HCPCS

## 2024-04-01 ASSESSMENT — ENCOUNTER SYMPTOMS
STOOL DESCRIPTION: FORMED
BOWEL INCONTINENCE: 1

## 2024-04-02 ENCOUNTER — HOME CARE VISIT (OUTPATIENT)
Dept: SCHEDULING | Facility: HOME HEALTH | Age: 89
End: 2024-04-02
Payer: MEDICARE

## 2024-04-02 PROCEDURE — G0156 HHCP-SVS OF AIDE,EA 15 MIN: HCPCS

## 2024-04-03 ENCOUNTER — HOME CARE VISIT (OUTPATIENT)
Dept: HOSPICE | Facility: HOSPICE | Age: 89
End: 2024-04-03
Payer: MEDICARE

## 2024-04-03 PROCEDURE — G0155 HHCP-SVS OF CSW,EA 15 MIN: HCPCS

## 2024-04-03 ASSESSMENT — ENCOUNTER SYMPTOMS
BOWEL INCONTINENCE: 1
STOOL DESCRIPTION: FORMED

## 2024-04-03 NOTE — HOSPICE
MSW routine monthly visit this date with Ed and his spouse, Brielle.  Ed was up and dressed, in his w/c, sleeping.  He aroused to MSW's conversation, only opening one eye and a one word answer.  No s/s pain, emotional or behavioral distress.  He would push himself backward and forward in the room as MSW visited with Brielle.  MSW attempted to reach Terence after visit via phone call.  No answer, but message left offering update of visit and ongoing support as needed.  MSW will cont to offer visits monthly and prn to provide ongoing emotional support and assessment of psychosocial and bereavement concerns and needs.

## 2024-04-04 ENCOUNTER — HOME CARE VISIT (OUTPATIENT)
Dept: SCHEDULING | Facility: HOME HEALTH | Age: 89
End: 2024-04-04
Payer: MEDICARE

## 2024-04-04 PROCEDURE — G0156 HHCP-SVS OF AIDE,EA 15 MIN: HCPCS

## 2024-04-04 NOTE — HOSPICE
Ed was dressed and sitting at dining room table eating lunch with his wife, Brielle.  Joint visit.  Brielle did all the talking.  Ed would only mutter a sound on a word that was not understandable.  He attempted to feed himself with a spoon, knife in his plate, no fork.  He was alert, appearing comfortable with no pain level expressed or observed.  He did not engage in conversation.  Staff came by the table several times one time to bring dessert and the other to take plates.  One staff asked if he was finished eating, Ed, muttered, \"yes\" and the staff took the plate and left.  Brielle and the  told the staff that he had not eaten any of his food and what food he managed to get into his mouth, he spit out.   provided spiritual care through pastoral conversation, active listening, supportive responses, and prayer.  Next regular scheduled visit is set for Thursday, May 9 btw 11/12.    contacted Terence, son and KATIE Armstrong, and updated them on visit.

## 2024-04-08 ENCOUNTER — HOME CARE VISIT (OUTPATIENT)
Dept: SCHEDULING | Facility: HOME HEALTH | Age: 89
End: 2024-04-08
Payer: MEDICARE

## 2024-04-08 PROCEDURE — G0156 HHCP-SVS OF AIDE,EA 15 MIN: HCPCS

## 2024-04-08 PROCEDURE — G0299 HHS/HOSPICE OF RN EA 15 MIN: HCPCS

## 2024-04-09 ENCOUNTER — HOME CARE VISIT (OUTPATIENT)
Dept: SCHEDULING | Facility: HOME HEALTH | Age: 89
End: 2024-04-09
Payer: MEDICARE

## 2024-04-09 PROCEDURE — G0156 HHCP-SVS OF AIDE,EA 15 MIN: HCPCS

## 2024-04-09 ASSESSMENT — ENCOUNTER SYMPTOMS
BOWEL INCONTINENCE: 1
STOOL DESCRIPTION: FORMED

## 2024-04-11 ENCOUNTER — HOME CARE VISIT (OUTPATIENT)
Dept: SCHEDULING | Facility: HOME HEALTH | Age: 89
End: 2024-04-11
Payer: MEDICARE

## 2024-04-11 PROCEDURE — G0156 HHCP-SVS OF AIDE,EA 15 MIN: HCPCS

## 2024-04-15 ENCOUNTER — HOME CARE VISIT (OUTPATIENT)
Dept: SCHEDULING | Facility: HOME HEALTH | Age: 89
End: 2024-04-15
Payer: MEDICARE

## 2024-04-15 PROCEDURE — G0156 HHCP-SVS OF AIDE,EA 15 MIN: HCPCS

## 2024-04-15 PROCEDURE — G0299 HHS/HOSPICE OF RN EA 15 MIN: HCPCS

## 2024-04-15 ASSESSMENT — ENCOUNTER SYMPTOMS
BOWEL INCONTINENCE: 1
STOOL DESCRIPTION: FORMED

## 2024-04-16 ENCOUNTER — HOME CARE VISIT (OUTPATIENT)
Dept: HOSPICE | Facility: HOSPICE | Age: 89
End: 2024-04-16
Payer: MEDICARE

## 2024-04-16 PROCEDURE — G0299 HHS/HOSPICE OF RN EA 15 MIN: HCPCS

## 2024-04-17 ENCOUNTER — HOME CARE VISIT (OUTPATIENT)
Dept: SCHEDULING | Facility: HOME HEALTH | Age: 89
End: 2024-04-17
Payer: MEDICARE

## 2024-04-17 VITALS
HEART RATE: 64 BPM | SYSTOLIC BLOOD PRESSURE: 125 MMHG | DIASTOLIC BLOOD PRESSURE: 41 MMHG | TEMPERATURE: 98.1 F | RESPIRATION RATE: 20 BRPM

## 2024-04-17 PROCEDURE — G0156 HHCP-SVS OF AIDE,EA 15 MIN: HCPCS

## 2024-04-17 NOTE — HOSPICE
PRN Visit. Facility staff called to say pt is not able to swallow and would spit out fluids etc. Arrived at facility. Was told by Hospice RNCM that Covid present in facility. Staff there said pt was negative for Covid this evening. Pt in room in [facility] sofa sleeping. He was quite rousable, appeared weak and feeble. Speech weak, very low volume. He said he did not have any pain this evening. Staff gave pt some water to drink, pt swallowed ok. more water given, pt choking some, cough dry. Pt in no respiratory distress. His overall skin is cool. Staff said they still get pt up in wheelchair and pt able to paddle himself around the facility. Staff said pt did not eat today, and has not been eating the past 2 weeks. Pt may be slowly declining. /41, HR 64 radial, RR20, T 98.1 tympanic. Gown, mask, gloves and shoe covers worn going into facility/pts room.

## 2024-04-18 VITALS — DIASTOLIC BLOOD PRESSURE: 63 MMHG | RESPIRATION RATE: 16 BRPM | SYSTOLIC BLOOD PRESSURE: 118 MMHG | HEART RATE: 84 BPM

## 2024-04-18 ASSESSMENT — ENCOUNTER SYMPTOMS
STOOL DESCRIPTION: FORMED
BOWEL INCONTINENCE: 1

## 2024-04-18 NOTE — HOSPICE
a 89yo with a hospice diagnosis of Alzheimer's disease with behavioral disturbance        *On arrival, pt is now in her apartment with her spouse with whom she shares this apartment. Both are slumped over in their wheelchairs sleeping.    *Pt responds to tactile stimuli, pt is semi-alert, oriented to self and reacted as if he recognized this nurse.   *Pt's right eye is matted shut via a green crusted matter. Pt's face is washed with a warm washcloth and warm compresses applied to the right eye until pt is able to open it.  *Pt present weakness and has signs of dehydration (tenting skin, sunken eyes, dry lips and thirst).  Facility staff reports a lack of appetite or pt will refuse to eat altogether. Pt is experiencing some notable weight-loss AEB looser fitting clothing and a decrease in Mid Upper Arm (currently Left MUAC =22cm).       *At this time, pt is not able to make needs known and should be checked on regularly. Pt should be monitored for needs.      *Pt presents fatigue and has dozed for the entire visit. At visit end, pt was assisted into his recliner where he immediately fell asleep.     *Pt's son (Terence) is contacted after SN visits for a pt update. Son will bring liquid supplements (Ensure) for pt since experiencing appetite loss.  *Facility staff encourage to offer fluids regularly.    *Hospice NP contacted to report pt's eye condition.           SUMMARY    *Code status: DNR    *Mental Status (MS)/Level of consciousness (LOC): On admission (9/13/2023), Pt is alert and fully oriented to self. Pt can sometimes construct an appropriate phrase as portions of his speech is comprehensible. The majority of the time when he is communicating his speech mainly consist of receptive aphasia (1-3 words may be understood and within context). Pt is not completely able to make needs known and must be monitored for needs.    4/15/24- At this time, pt is not able to make needs known and should be checked on regularly.

## 2024-04-19 ENCOUNTER — HOME CARE VISIT (OUTPATIENT)
Dept: SCHEDULING | Facility: HOME HEALTH | Age: 89
End: 2024-04-19
Payer: MEDICARE

## 2024-04-19 PROCEDURE — G0156 HHCP-SVS OF AIDE,EA 15 MIN: HCPCS

## 2024-04-21 ENCOUNTER — HOME CARE VISIT (OUTPATIENT)
Dept: HOSPICE | Facility: HOSPICE | Age: 89
End: 2024-04-21
Payer: MEDICARE

## 2024-04-21 ENCOUNTER — HOME CARE VISIT (OUTPATIENT)
Dept: SCHEDULING | Facility: HOME HEALTH | Age: 89
End: 2024-04-21
Payer: MEDICARE

## 2024-04-21 VITALS — RESPIRATION RATE: 14 BRPM | HEART RATE: 77 BPM | DIASTOLIC BLOOD PRESSURE: 56 MMHG | SYSTOLIC BLOOD PRESSURE: 100 MMHG

## 2024-04-21 PROCEDURE — G0299 HHS/HOSPICE OF RN EA 15 MIN: HCPCS

## 2024-04-22 ENCOUNTER — HOME CARE VISIT (OUTPATIENT)
Dept: SCHEDULING | Facility: HOME HEALTH | Age: 89
End: 2024-04-22
Payer: MEDICARE

## 2024-04-22 PROCEDURE — G0299 HHS/HOSPICE OF RN EA 15 MIN: HCPCS

## 2024-04-22 PROCEDURE — G0156 HHCP-SVS OF AIDE,EA 15 MIN: HCPCS

## 2024-04-24 ENCOUNTER — HOME CARE VISIT (OUTPATIENT)
Dept: SCHEDULING | Facility: HOME HEALTH | Age: 89
End: 2024-04-24
Payer: MEDICARE

## 2024-04-24 PROCEDURE — G0156 HHCP-SVS OF AIDE,EA 15 MIN: HCPCS

## 2024-04-26 ENCOUNTER — HOME CARE VISIT (OUTPATIENT)
Dept: HOSPICE | Facility: HOSPICE | Age: 89
End: 2024-04-26
Payer: MEDICARE

## 2024-04-26 ENCOUNTER — HOME CARE VISIT (OUTPATIENT)
Dept: SCHEDULING | Facility: HOME HEALTH | Age: 89
End: 2024-04-26
Payer: MEDICARE

## 2024-04-26 PROCEDURE — G0156 HHCP-SVS OF AIDE,EA 15 MIN: HCPCS

## 2024-04-29 ENCOUNTER — HOME CARE VISIT (OUTPATIENT)
Dept: SCHEDULING | Facility: HOME HEALTH | Age: 89
End: 2024-04-29
Payer: MEDICARE

## 2024-04-29 PROCEDURE — G0299 HHS/HOSPICE OF RN EA 15 MIN: HCPCS

## 2024-04-29 PROCEDURE — G0156 HHCP-SVS OF AIDE,EA 15 MIN: HCPCS

## 2024-04-29 ASSESSMENT — ENCOUNTER SYMPTOMS
BOWEL INCONTINENCE: 1
STOOL DESCRIPTION: FORMED

## 2024-05-01 ENCOUNTER — HOME CARE VISIT (OUTPATIENT)
Dept: SCHEDULING | Facility: HOME HEALTH | Age: 89
End: 2024-05-01
Payer: MEDICARE

## 2024-05-01 PROCEDURE — G0156 HHCP-SVS OF AIDE,EA 15 MIN: HCPCS

## 2024-05-01 NOTE — HOSPICE
(SN) visits planned for one (1) visit/week for physical assessment, education, medications, and symptom management.  -Hospice Health Aide (HHA) visits planned for three (3) visits/week for personal care and ADL assist.   *4/1/24- Due to physical decline HHA services are increased to three times weekly.           Coordination/Collaboration: Coordination/Collaboration of pt's care occurs in various ways with the hospice Skilled Nurse (SN) and hospice care team via pt visits, the facility staff, pt's facility care plan meetings and during the hospice interdisciplinary team's biweekly meetings (IDG). Hospice drives pt's care, therefore; the facility staff are advised to contact [facility] ([facility]) [phone] regarding any pt concerns, fall(s) and/or any change in pt's condition. Facility staff voices an understanding regarding contacting hospice first, not 911.

## 2024-05-03 ENCOUNTER — HOME CARE VISIT (OUTPATIENT)
Dept: SCHEDULING | Facility: HOME HEALTH | Age: 89
End: 2024-05-03
Payer: MEDICARE

## 2024-05-06 ENCOUNTER — HOME CARE VISIT (OUTPATIENT)
Dept: HOSPICE | Facility: HOSPICE | Age: 89
End: 2024-05-06
Payer: MEDICARE

## 2024-05-06 PROCEDURE — G0155 HHCP-SVS OF CSW,EA 15 MIN: HCPCS

## 2024-05-06 ASSESSMENT — ENCOUNTER SYMPTOMS
STOOL DESCRIPTION: FORMED
TROUBLE SWALLOWING: 1
BOWEL INCONTINENCE: 1

## 2024-05-06 NOTE — HOSPICE
Routine MSW visit with Pt in his room with his spouse/roommate present.  He was awake during our visit, which was unusual.  He typically is sound asleep.  He appears very frail, his shirt too large on him.  His lower legs appeared purple.  His spouse said PT usually has swelling.  He has a recliner to help keep his legs raised which she reported as currently not working.  She said that Terence is aware.  Per collaboration, Pt's diet recently downgraded to pureed.  He smiled easily, responded at times with one word answers when promted.  MSW noted PT to repetitively fidget with his fingers and rubbing his hands on his legs.  MSW asked if he was in pain and he said, \"No\".  MSW asked if he felt anxious and he said \"yes\".  He did not indicate any other s/s emotional distress and denied feeling sad. MSW attempted to reach PCG after visit.  No answer, but voice message left offering update of visit and ongoing support.

## 2024-05-07 ENCOUNTER — HOME CARE VISIT (OUTPATIENT)
Dept: SCHEDULING | Facility: HOME HEALTH | Age: 89
End: 2024-05-07
Payer: MEDICARE

## 2024-05-07 NOTE — HOSPICE
Joint visit with wife, Brielle, present.  Corky was dressed and sitting in a wc in a gathering space of their unit.  Other residents were there also.  Corky was awake, alert, minimally verbal, appearing comfortable with no pain level expressed or observed.  He was rubbing both of his legs and denied any pain level when asked.  His ankles were slightly swollen and his legs from the ankle to the knee were red.  His arms were marked with bruises and blotches.  He presented unkept.  He said that he was eating ok but Brielle corrected him and stated that he was not.  Conversation was minimal as Brielle spoke most of the time.  No needs or concerns were shared.   provided spiritual care through pastoral conversation, active listening, supportive responses, scripture reading (Ps 91: 1 - 4, 9,10,14 - 16) and prayer.   Next regular scheduled visit is set for Tuesday, June 11 btw 11/12.   contacted and spoke with Terence, son, and updated him on conversation.  Terence shared that he had gotten Ensure for his father to drink during the day because he is getting to where he can not feed himself and he needs help with his food.  Facility is not able to feed him.

## 2024-05-08 ENCOUNTER — HOME CARE VISIT (OUTPATIENT)
Dept: SCHEDULING | Facility: HOME HEALTH | Age: 89
End: 2024-05-08
Payer: MEDICARE

## 2024-05-08 PROCEDURE — G0156 HHCP-SVS OF AIDE,EA 15 MIN: HCPCS

## 2024-05-09 ENCOUNTER — HOME CARE VISIT (OUTPATIENT)
Dept: SCHEDULING | Facility: HOME HEALTH | Age: 89
End: 2024-05-09
Payer: MEDICARE

## 2024-05-09 VITALS
DIASTOLIC BLOOD PRESSURE: 78 MMHG | HEART RATE: 68 BPM | TEMPERATURE: 97.5 F | RESPIRATION RATE: 14 BRPM | OXYGEN SATURATION: 95 % | SYSTOLIC BLOOD PRESSURE: 112 MMHG

## 2024-05-09 PROCEDURE — G0299 HHS/HOSPICE OF RN EA 15 MIN: HCPCS

## 2024-05-09 ASSESSMENT — ENCOUNTER SYMPTOMS: BOWEL INCONTINENCE: 1

## 2024-05-10 ENCOUNTER — HOME CARE VISIT (OUTPATIENT)
Dept: SCHEDULING | Facility: HOME HEALTH | Age: 89
End: 2024-05-10
Payer: MEDICARE

## 2024-05-10 PROCEDURE — G0156 HHCP-SVS OF AIDE,EA 15 MIN: HCPCS

## 2024-05-10 NOTE — HOSPICE
Routine visit made. RN was met in the hallway outside of Ed's door by his son Priscila Pratt and facility  Tessy. They were having a discussion concerning Ed's care. Terence stated that when they visited today Ed was still in his soiled brief and is breakfast tray was left on the table across the room from his bed out of his reach. Terence expressed concern on Ed not being cleaned and not having his food available or having his ensure drinks offered to him. Facility staff stated that lots of mornings Ed does not do well if he is woken up early to eat and does better if he is offered food later. They stated he will become agitated and disagreeable and resistant to following commands. RN listened to each of the concerns and reinforced that the expectation should be that Ed's soiled brief is changed every morning from the night before and that should not be something they let him sleep through. RN encouraged them to offer him his breakfast or ensure at that time but if he is resisting it or disinterested it is okay to wait and offer it again later when he is ready. Terence and facility staff agreeable to plan of care for Ed. Terence stated they were visiting today was Brielle's(Ed's wife) birthday today. Vitals and assessment as charted. No signs of acute pain or distress noted. Bilaterally lower extremity edema noted 1+ with erythema, legs are warm to touch and are not weeping. No medication refills or supplies needed at this time. Terence and facility staff aware to call [facility] at anytime for any questions, concerns or changes.

## 2024-05-13 ENCOUNTER — HOME CARE VISIT (OUTPATIENT)
Dept: SCHEDULING | Facility: HOME HEALTH | Age: 89
End: 2024-05-13
Payer: MEDICARE

## 2024-05-13 PROCEDURE — G0156 HHCP-SVS OF AIDE,EA 15 MIN: HCPCS

## 2024-05-13 ASSESSMENT — ENCOUNTER SYMPTOMS
BOWEL INCONTINENCE: 1
STOOL DESCRIPTION: SOFT FORMED

## 2024-05-17 ENCOUNTER — HOME CARE VISIT (OUTPATIENT)
Dept: SCHEDULING | Facility: HOME HEALTH | Age: 89
End: 2024-05-17
Payer: MEDICARE

## 2024-05-17 PROCEDURE — G0156 HHCP-SVS OF AIDE,EA 15 MIN: HCPCS

## 2024-05-20 ENCOUNTER — HOME CARE VISIT (OUTPATIENT)
Dept: SCHEDULING | Facility: HOME HEALTH | Age: 89
End: 2024-05-20
Payer: MEDICARE

## 2024-05-20 PROCEDURE — G0156 HHCP-SVS OF AIDE,EA 15 MIN: HCPCS

## 2024-05-22 ENCOUNTER — HOME CARE VISIT (OUTPATIENT)
Dept: SCHEDULING | Facility: HOME HEALTH | Age: 89
End: 2024-05-22
Payer: MEDICARE

## 2024-05-22 PROCEDURE — G0156 HHCP-SVS OF AIDE,EA 15 MIN: HCPCS

## 2024-05-24 ENCOUNTER — HOME CARE VISIT (OUTPATIENT)
Dept: SCHEDULING | Facility: HOME HEALTH | Age: 89
End: 2024-05-24
Payer: MEDICARE

## 2024-05-24 PROCEDURE — G0156 HHCP-SVS OF AIDE,EA 15 MIN: HCPCS

## 2024-05-29 ENCOUNTER — HOME CARE VISIT (OUTPATIENT)
Dept: SCHEDULING | Facility: HOME HEALTH | Age: 89
End: 2024-05-29
Payer: MEDICARE

## 2024-05-29 PROCEDURE — G0156 HHCP-SVS OF AIDE,EA 15 MIN: HCPCS

## 2024-05-30 ENCOUNTER — HOME CARE VISIT (OUTPATIENT)
Dept: HOSPICE | Facility: HOSPICE | Age: 89
End: 2024-05-30
Payer: MEDICARE

## 2024-05-30 VITALS
HEART RATE: 84 BPM | SYSTOLIC BLOOD PRESSURE: 110 MMHG | DIASTOLIC BLOOD PRESSURE: 50 MMHG | RESPIRATION RATE: 18 BRPM | TEMPERATURE: 98.5 F

## 2024-05-30 PROCEDURE — G0299 HHS/HOSPICE OF RN EA 15 MIN: HCPCS

## 2024-05-30 ASSESSMENT — ENCOUNTER SYMPTOMS
STOOL DESCRIPTION: SOFT
BOWEL INCONTINENCE: 1

## 2024-05-30 NOTE — HOSPICE
SN arrived to patient and wife's (also OAH patient) room and found Mr. Sue awake, dressed, and sitting in wheelchair.  He states he had breakfast.  Responds slowly, but clearly.  Alert and oriented to self.  Denies pain, SOB, concerns.  MAHC 9; PPS 40%.  ROS and VS as documented.  BLE slight erythema, no weeping, 1+ edema.  Spoke with staff who confirms Mr. Sue sits with legs dependent most of the day.  Staff also states he has been taking his medications without protest and sleeping well.  No concerns relayed to [facility] SN.  Confirmed adequate supplies present.  Staff denies need for new medications.  Reviewed how to contact [facility] for questions or concerns.

## 2024-05-31 ENCOUNTER — HOME CARE VISIT (OUTPATIENT)
Dept: SCHEDULING | Facility: HOME HEALTH | Age: 89
End: 2024-05-31
Payer: MEDICARE

## 2024-05-31 PROCEDURE — G0156 HHCP-SVS OF AIDE,EA 15 MIN: HCPCS

## 2024-06-02 ENCOUNTER — HOME CARE VISIT (OUTPATIENT)
Dept: SCHEDULING | Facility: HOME HEALTH | Age: 89
End: 2024-06-02
Payer: MEDICARE

## 2024-06-02 VITALS
SYSTOLIC BLOOD PRESSURE: 85 MMHG | TEMPERATURE: 96.8 F | HEART RATE: 48 BPM | RESPIRATION RATE: 22 BRPM | DIASTOLIC BLOOD PRESSURE: 44 MMHG

## 2024-06-02 PROCEDURE — G0299 HHS/HOSPICE OF RN EA 15 MIN: HCPCS

## 2024-06-03 ENCOUNTER — HOME CARE VISIT (OUTPATIENT)
Dept: SCHEDULING | Facility: HOME HEALTH | Age: 89
End: 2024-06-03
Payer: MEDICARE

## 2024-06-03 ENCOUNTER — HOME CARE VISIT (OUTPATIENT)
Dept: HOSPICE | Facility: HOSPICE | Age: 89
End: 2024-06-03
Payer: MEDICARE

## 2024-06-03 PROCEDURE — G0155 HHCP-SVS OF CSW,EA 15 MIN: HCPCS

## 2024-06-03 PROCEDURE — G0299 HHS/HOSPICE OF RN EA 15 MIN: HCPCS

## 2024-06-03 PROCEDURE — G0156 HHCP-SVS OF AIDE,EA 15 MIN: HCPCS

## 2024-06-03 NOTE — HOSPICE
Problem: Nutritional:  Goal: Achieve adequate nutritional intake  Patient will consume >50% of meals  Outcome: NOT MET         Routine monthly MSW visit this date with PT and spouse who live in the same room at [facility].  Pt was up and dressed, in his w/c, asleep.  He opened his eyes only once during visit.  At this point, he denied pain.  MSW noted him to frequently lean over in his chair and rub his legs.  MSW noted bruising to his right middle finger.  Per collaboration, Pt had a fall over the weekend.  No s/s unmanaged behavioral or emotional distress.  MSW attempted to reach PCG during visit.  No answer, but message left offering update of visit and encouraged return call as needed.  MSW will cont to offer visits monthly and prn to provide ongoing emotional support and assessment of psychosocial and bereavement concerns and needs.

## 2024-06-05 ENCOUNTER — HOME CARE VISIT (OUTPATIENT)
Dept: SCHEDULING | Facility: HOME HEALTH | Age: 89
End: 2024-06-05
Payer: MEDICARE

## 2024-06-05 PROCEDURE — G0156 HHCP-SVS OF AIDE,EA 15 MIN: HCPCS

## 2024-06-05 NOTE — HOSPICE
Joint visit with Brielle pt's wife.  Ed was dressed, sitting custodial in his wc.  He appeared tired and often would close his eyes as if napping.  When awake, Ed was able to respond to questions/comments appropriately and he was hospitable in his dialogue thanking the  for visiting and explaining how he felt.  Brielle was most verbal and provided most of the conversation.  She spoke of his recent slipping from his wc and her concern for not being able to care for him like she use to.  Ed appeared comfortable with no pain level observed, expressing that his head hurt.  He would continually rub his thighs during the visit.  He was open to prayer.   provided spiritual care through pastoral conversation, active listening, supportive responses, and prayer.  Next regular scheduled visit is set for Tuesday, July 9 btw 11/1.   contacted and spoke with Terence, pt's son, and updating him on visit.   also contacted and spoke with KATIE Armstrong, and updated her on visit.

## 2024-06-07 ENCOUNTER — HOME CARE VISIT (OUTPATIENT)
Dept: SCHEDULING | Facility: HOME HEALTH | Age: 89
End: 2024-06-07
Payer: MEDICARE

## 2024-06-07 PROCEDURE — G0156 HHCP-SVS OF AIDE,EA 15 MIN: HCPCS

## 2024-06-10 ENCOUNTER — HOME CARE VISIT (OUTPATIENT)
Dept: SCHEDULING | Facility: HOME HEALTH | Age: 89
End: 2024-06-10
Payer: MEDICARE

## 2024-06-10 PROCEDURE — G0156 HHCP-SVS OF AIDE,EA 15 MIN: HCPCS

## 2024-06-12 ENCOUNTER — HOME CARE VISIT (OUTPATIENT)
Dept: SCHEDULING | Facility: HOME HEALTH | Age: 89
End: 2024-06-12
Payer: MEDICARE

## 2024-06-12 PROCEDURE — G0156 HHCP-SVS OF AIDE,EA 15 MIN: HCPCS

## 2024-06-14 ENCOUNTER — HOME CARE VISIT (OUTPATIENT)
Dept: SCHEDULING | Facility: HOME HEALTH | Age: 89
End: 2024-06-14
Payer: MEDICARE

## 2024-06-14 ENCOUNTER — HOME CARE VISIT (OUTPATIENT)
Dept: HOSPICE | Facility: HOSPICE | Age: 89
End: 2024-06-14
Payer: MEDICARE

## 2024-06-14 VITALS
SYSTOLIC BLOOD PRESSURE: 96 MMHG | HEART RATE: 84 BPM | DIASTOLIC BLOOD PRESSURE: 60 MMHG | TEMPERATURE: 98.6 F | RESPIRATION RATE: 20 BRPM

## 2024-06-14 PROCEDURE — G0299 HHS/HOSPICE OF RN EA 15 MIN: HCPCS

## 2024-06-14 PROCEDURE — G0156 HHCP-SVS OF AIDE,EA 15 MIN: HCPCS

## 2024-06-14 ASSESSMENT — ENCOUNTER SYMPTOMS: STOOL DESCRIPTION: FORMED

## 2024-06-14 NOTE — HOSPICE
SN arrived to patient's room that he shares with his wife, and fellow OA client.  Patient's wife answers many of the questions directed to Mr. Sue as his responses are delayed.  He is A+Ox1, flat affect.  Denies pain, SOB, anxiety.  States he eats \"okay\" and denies difficulty sleeping or swallowing.  States he had a BM this AM.  MAHC 9; PPS 40%. ROS and VS as documented. BLE slight erythema, no weeping, 2+ edema on right, 1+ on left .  Confirmed adequate supplies present. Staff denies need for new medications. Reviewed how to contact [facility] for questions or concerns.  Will call son Terence to update.

## 2024-06-17 ENCOUNTER — HOME CARE VISIT (OUTPATIENT)
Dept: SCHEDULING | Facility: HOME HEALTH | Age: 89
End: 2024-06-17
Payer: MEDICARE

## 2024-06-17 PROCEDURE — G0156 HHCP-SVS OF AIDE,EA 15 MIN: HCPCS

## 2024-06-19 ENCOUNTER — HOME CARE VISIT (OUTPATIENT)
Dept: SCHEDULING | Facility: HOME HEALTH | Age: 89
End: 2024-06-19
Payer: MEDICARE

## 2024-06-19 PROCEDURE — G0156 HHCP-SVS OF AIDE,EA 15 MIN: HCPCS

## 2024-06-21 ENCOUNTER — HOME CARE VISIT (OUTPATIENT)
Dept: HOSPICE | Facility: HOSPICE | Age: 89
End: 2024-06-21
Payer: MEDICARE

## 2024-06-21 ENCOUNTER — HOME CARE VISIT (OUTPATIENT)
Dept: SCHEDULING | Facility: HOME HEALTH | Age: 89
End: 2024-06-21
Payer: MEDICARE

## 2024-06-21 VITALS
TEMPERATURE: 98 F | RESPIRATION RATE: 20 BRPM | SYSTOLIC BLOOD PRESSURE: 110 MMHG | DIASTOLIC BLOOD PRESSURE: 50 MMHG | HEART RATE: 56 BPM

## 2024-06-21 PROCEDURE — G0299 HHS/HOSPICE OF RN EA 15 MIN: HCPCS

## 2024-06-21 PROCEDURE — G0156 HHCP-SVS OF AIDE,EA 15 MIN: HCPCS

## 2024-06-21 ASSESSMENT — ENCOUNTER SYMPTOMS: STOOL DESCRIPTION: FORMED

## 2024-06-21 NOTE — HOSPICE
Upon SN arrival to patient's room, found Mr. Sue up and dressed in his wheelchair.  Upon greeting, Mrs. Sue points to his television and states, \"Can you fix that?'  Television is on a standby image.  Used remotes to find Heriberto Bansal show to his satisfaction.  A+Ox1, quiet.  Answers questions with quiet, brief answers.  Denies pain.  When SN asked about his feet he looked down to his feet and briefly lifted his right one.  MAHC 8; PPS 40%.  Bilateral lower extremities with trace pitting edema; thready pulses.  Neither patient, nor wife, whom he shares a room with, is able to answer about his PO intake or BM status.  Spoke with VIVIAN Kaur, who states that Mr. Sue wakes with a saturated brief QAM, and that he eats 50% or more of his meals, however a meals a week he will spit out food after he chews it up.  He is incontinent of bowels, but goes almost daily.  Confirmed adequate supplies present. Staff denies need for new medications. Reviewed how to contact OA for questions or concerns. Will call son Terence to update.  Attempt X 3 to contact Terence without success.

## 2024-06-24 ENCOUNTER — HOME CARE VISIT (OUTPATIENT)
Dept: HOSPICE | Facility: HOSPICE | Age: 89
End: 2024-06-24
Payer: MEDICARE

## 2024-06-24 ENCOUNTER — HOME CARE VISIT (OUTPATIENT)
Dept: SCHEDULING | Facility: HOME HEALTH | Age: 89
End: 2024-06-24
Payer: MEDICARE

## 2024-06-24 PROCEDURE — G0299 HHS/HOSPICE OF RN EA 15 MIN: HCPCS

## 2024-06-24 PROCEDURE — G0156 HHCP-SVS OF AIDE,EA 15 MIN: HCPCS

## 2024-06-26 ENCOUNTER — HOME CARE VISIT (OUTPATIENT)
Dept: SCHEDULING | Facility: HOME HEALTH | Age: 89
End: 2024-06-26
Payer: MEDICARE

## 2024-06-26 VITALS — TEMPERATURE: 97.2 F

## 2024-06-26 PROCEDURE — G0156 HHCP-SVS OF AIDE,EA 15 MIN: HCPCS

## 2024-06-26 PROCEDURE — G0299 HHS/HOSPICE OF RN EA 15 MIN: HCPCS

## 2024-06-26 NOTE — HOSPICE
PRN visit made. Staff at facility where pt resides informed CM that pt had developed what looked like an injury to his toe from his \"shoe rubbing\". Upon assessment RN observed pt's second digit of right toe with 1.5 cm x 2 cm open area. Wound appears to be a pressure injury from pt's shoe. Wound was red and painful to the touch. See wound addendum. Pt was afebrile. New orders obtained and CM updated. RN informed staff that wound was assessed and cared for.   Wound care supplies ordered.

## 2024-06-27 ENCOUNTER — HOME CARE VISIT (OUTPATIENT)
Dept: SCHEDULING | Facility: HOME HEALTH | Age: 89
End: 2024-06-27
Payer: MEDICARE

## 2024-06-27 VITALS — SYSTOLIC BLOOD PRESSURE: 94 MMHG | RESPIRATION RATE: 16 BRPM | HEART RATE: 72 BPM | DIASTOLIC BLOOD PRESSURE: 51 MMHG

## 2024-06-27 PROCEDURE — G0156 HHCP-SVS OF AIDE,EA 15 MIN: HCPCS

## 2024-06-27 ASSESSMENT — ENCOUNTER SYMPTOMS
BOWEL INCONTINENCE: 1
STOOL DESCRIPTION: FORMED

## 2024-06-27 NOTE — HOSPICE
a 89yo with a hospice diagnosis of Alzheimer's disease with behavioral disturbance        *On arrival, pt is in his apartment with his spouse with whom he shares this apartment. Both are slumped over in their wheelchairs sleeping.      *Pt presents fatigue and has dozed for the entire visit. Pt's spouse reports pt continuously sleeps while in his wheelchair. This nurse will speak to the MD regarding a medication review and adjustment.    *Pt responds to tactile stimuli, pt is semi-alert, oriented to self and reacted as if he recognized this nurse.   *Pt present weakness and has signs of dehydration (tenting skin, sunken eyes, dry lips and thirst). Facility staff encourage to offer fluids regularly.  Facility staff reports a lack of appetite or pt will refuse to eat altogether. Pt is experiencing some notable weight-loss AEB looser fitting clothing and a decrease in Left Mid Upper Arm Circumference.        *At this time, pt is not able to make needs known and should be checked on regularly. Pt should be monitored for needs.        *Pt's son (Terence) is contacted after SN visits for a pt update.          SUMMARY    *Code status: DNR    *Mental Status (MS)/Level of consciousness (LOC): On admission (9/13/2023), Pt is alert and fully oriented to self. Pt can sometimes construct an appropriate phrase as portions of his speech is comprehensible. The majority of the time when he is communicating his speech mainly consist of receptive aphasia (1-3 words may be understood and within context). Pt is not completely able to make needs known and must be monitored for needs.    4/15/24- At this time, pt is not able to make needs known and should be checked on regularly. Pt should be monitored for needs.    *Palliative Performance Scale (PPS): On admission (9/13/2023), PPS =40%.    *Appetite/Nutritional Intake: On admission (9/13/2023), pt is self-fed, his appetite is fair and nutritional intake is inadequate. No reported

## 2024-07-01 ENCOUNTER — HOME CARE VISIT (OUTPATIENT)
Dept: SCHEDULING | Facility: HOME HEALTH | Age: 89
End: 2024-07-01
Payer: MEDICARE

## 2024-07-01 PROCEDURE — G0299 HHS/HOSPICE OF RN EA 15 MIN: HCPCS

## 2024-07-01 PROCEDURE — G0156 HHCP-SVS OF AIDE,EA 15 MIN: HCPCS

## 2024-07-03 ENCOUNTER — HOME CARE VISIT (OUTPATIENT)
Dept: SCHEDULING | Facility: HOME HEALTH | Age: 89
End: 2024-07-03
Payer: MEDICARE

## 2024-07-03 NOTE — HOSPICE
Corky was sitting in his wc in his apartment with his wife, Brielle.  He was dressed, alert, minimally verbal (muttering and very difficult to understand), and appeared comfortable with no pain level expressed or observed.  Breathing regular.  Right eye open, red, and with some goo in it.  Brielle spoke for him most of the time.  He did acknowledge the  presence and stated he was glad that the  came by.  No needs or concerns were shared.  Stated he was sleeping good and slept well last night.  Said he was also eating ok.   provided spiritual care through pastoral conversation, active listening, supportive responses, scripture reading (Zak 8: 22 - 28) and prayer.  Next regular scheduled visit is set for Thursday, August 8 btw 11/2.   contacted and spoke with Terence, son, and KATIE Armstrong, updating them on visit.

## 2024-07-04 ENCOUNTER — HOME CARE VISIT (OUTPATIENT)
Dept: HOSPICE | Facility: HOSPICE | Age: 89
End: 2024-07-04
Payer: MEDICARE

## 2024-07-04 PROCEDURE — G0155 HHCP-SVS OF CSW,EA 15 MIN: HCPCS

## 2024-07-04 NOTE — HOSPICE
Routine MSW visit along with PT's spouse who also resides in the room.  MSW met first with spouse, as PT slept.  MSW noted him to mumble out loud off and on.  He did not arouse until facility staff assisted him out of bed and into his w/c.  Spouse reported he wanted to stay in bed this am and did not want his breakfast.  As staff assisting him OOB, MSW noted bandage to right toes and also a right arm bandage.  Aide stated she believes it was a skin tear on his arm.  Pt was not verbal, but reached out to shake MSW's hand upon saying \"hello\".  No s/s or report of unmanaged emotional or behavioral distress.  He appeared in good spirits when up in his w/c.  Spouse shared he is rarely verbal now and she cannot understand what he says when he does speak.  She said she has to guess at what he needs/wants.  MSW attempted to reach PCG after visit.  No answer or option to leave voice message.  MSW will cont to offer visits monthly and prn to provide ongoing emotional support and assessment of psychosocial and bereavement concerns and needs.

## 2024-07-05 ENCOUNTER — HOME CARE VISIT (OUTPATIENT)
Dept: HOSPICE | Facility: HOSPICE | Age: 89
End: 2024-07-05
Payer: MEDICARE

## 2024-07-05 ENCOUNTER — HOME CARE VISIT (OUTPATIENT)
Dept: SCHEDULING | Facility: HOME HEALTH | Age: 89
End: 2024-07-05
Payer: MEDICARE

## 2024-07-05 PROCEDURE — G0156 HHCP-SVS OF AIDE,EA 15 MIN: HCPCS

## 2024-07-05 PROCEDURE — G0299 HHS/HOSPICE OF RN EA 15 MIN: HCPCS

## 2024-07-06 VITALS
DIASTOLIC BLOOD PRESSURE: 82 MMHG | RESPIRATION RATE: 16 BRPM | DIASTOLIC BLOOD PRESSURE: 80 MMHG | SYSTOLIC BLOOD PRESSURE: 118 MMHG | HEART RATE: 86 BPM | RESPIRATION RATE: 16 BRPM | HEART RATE: 84 BPM | SYSTOLIC BLOOD PRESSURE: 116 MMHG

## 2024-07-06 ASSESSMENT — ENCOUNTER SYMPTOMS
TROUBLE SWALLOWING: 1
BOWEL INCONTINENCE: 1
STOOL DESCRIPTION: SOFT FORMED

## 2024-07-06 NOTE — HOSPICE
a 89yo with a hospice diagnosis of Alzheimer's disease with behavioral disturbance          *Wound orders: Right foot, second 2nd toe:    SN to perform dressing changes. Perform hand hygiene. Remove soiled dressing. Cleanse with wound cleanser. Pat dry. Apply hydrogel to the affected area and cover with a nonstick bandage. Change every 48 hours for four (4) visits, then twice weekly.     *Do not submerge foot.     *Dressing changes begins on Sunday 7/7/24 (take hydrogel and non-adherent bandage).    *Per provider, 2nd toe is inflamed no shoe to the right foot. ABT initiated: Augmentin 875 mg: One po BID for eight days.

## 2024-07-06 NOTE — HOSPICE
a 89yo with a hospice diagnosis of Alzheimer's disease with behavioral disturbance      .    SUMMARY    *Code status: DNR    *Mental Status (MS)/Level of consciousness (LOC): On arrival, pt is in his shared apartment with his spouse sitting in his wheelchair with his eyes closed. Pt's name is called out and he responds timely to verbal stimuli via he attempts to communicate as he presents expressive aphasia. Pt can sometimes construct an appropriate sentence but mainly has difficulty finding words to express himself.       Pt is not completely able to make needs known and must be monitored for needs.  Pt is assessed for pain. Via PainAD pain scale 0/10. Pt has Schedule and PRN pain medication available.     *Palliative Performance Scale (PPS): =40%.    *Appetite/Nutritional Intake: Facility staff encourage to offer fluids regularly. Facility staff reports a lack of appetite, nutritional intake has decreased or pt will refuse to eat altogether.   *Weight-loss/Mid Upper Arm Circumference (MUAC): Pt is experiencing some notable weight-loss AEB looser fitting clothing and a decrease in in Mid Upper Arm Circumference (MUAC) and calf circumference.        *Urinary/Gastrointestinal: Bowel sounds present in all quads. BMs are reported less daily. Pt is incontinent of bowel & bladder (B&B).      *Musculoskeletal/Mobility: Pt utilizes a wheelchair for mobility and can no longer “foot-walk” himself. Pt is weaker and easily fatigues with exertion.                                  -Left & Right calf and below has +1-+2 non-pitting edema with taunt skin. Lower Extremity (LE) elevation education revisited with facility staff each visit.     *Respiratory: No noted respiratory distress at rest, respirations are regular, even, unlabored and diminished throughout.    *Integumentary (Skin): Skin intact    *Fall Education: [facility] ([facility]) staff & the facility staff Coordinates/Collaborates regarding falls: Fall safety and

## 2024-07-07 ENCOUNTER — HOME CARE VISIT (OUTPATIENT)
Dept: SCHEDULING | Facility: HOME HEALTH | Age: 89
End: 2024-07-07
Payer: MEDICARE

## 2024-07-07 VITALS
TEMPERATURE: 97.7 F | HEART RATE: 75 BPM | DIASTOLIC BLOOD PRESSURE: 82 MMHG | RESPIRATION RATE: 15 BRPM | SYSTOLIC BLOOD PRESSURE: 116 MMHG

## 2024-07-07 PROCEDURE — G0299 HHS/HOSPICE OF RN EA 15 MIN: HCPCS

## 2024-07-08 ENCOUNTER — HOME CARE VISIT (OUTPATIENT)
Dept: SCHEDULING | Facility: HOME HEALTH | Age: 89
End: 2024-07-08
Payer: MEDICARE

## 2024-07-08 VITALS — HEART RATE: 60 BPM | DIASTOLIC BLOOD PRESSURE: 52 MMHG | SYSTOLIC BLOOD PRESSURE: 107 MMHG | RESPIRATION RATE: 16 BRPM

## 2024-07-08 PROCEDURE — G0156 HHCP-SVS OF AIDE,EA 15 MIN: HCPCS

## 2024-07-08 ASSESSMENT — ENCOUNTER SYMPTOMS
BOWEL INCONTINENCE: 1
STOOL DESCRIPTION: SOFT FORMED
TROUBLE SWALLOWING: 1

## 2024-07-08 NOTE — HOSPICE
Recertification Visit      Corky Sue is a 89yo male with a primary hospice Dx of Alzheimer's disease with behavioral disturbance. Admitted to Hospice services on 09/13/23 going into [facility] 5 with a Recertification date of 7/9/2024.        *Corky resides at [facility] with his spouse.     *Code status: DNR        SUMMARY     *Mental Status (MS)/Level of consciousness (LOC): On admission (9/13/2023), pt is alert and fully oriented to self. Pt can sometimes construct an appropriate phrase as portions of his speech is comprehensible. When he is communicating his speech mainly consist of expressive aphasia (1-3 words may be understood and within context). Pt is not completely able to make needs known and must be monitored for needs.     *Now, pt presents more episodes of confusion and is still unable to clearly make needs/concerns known and must be monitored. After multiple medication adjustments, pt's behaviors are managed (pt can be physically aggressive).  -Pt babbles nonsensical phrases (word salad). Few words may be understood at times but are rarely appropriate or pertinent. Expressive aphasia and incomprehensible.    *6/5/24- Due to physical and mental decline an extensive medication review/medication clean-up was performed by the MD. Multiple medications were discontinued.    *Palliative Performance Scale (PPS): On admission, PPS =40% and PPS remains at 40%.       *Appetite/Nutritional Intake: On admission, pt is self-fed, his appetite is fair and nutritional intake is inadequate. No reported nausea/vomiting and no swallowing concerns. Pt was eating 80%-100% of his meals and was also eating from his spouse's plate.  *Now pt continues to eat but needs assistance with feeding. His appetite and nutritional intake is poor. Pt is experiencing/displaying difficulty chewing. Diet change: Mechanical soft with finger foods as he is experiencing difficulty utilizing utensils.    *4/29/24- Facility staff reports

## 2024-07-10 ENCOUNTER — HOME CARE VISIT (OUTPATIENT)
Dept: SCHEDULING | Facility: HOME HEALTH | Age: 89
End: 2024-07-10
Payer: MEDICARE

## 2024-07-10 PROCEDURE — G0156 HHCP-SVS OF AIDE,EA 15 MIN: HCPCS

## 2024-07-12 ENCOUNTER — HOME CARE VISIT (OUTPATIENT)
Dept: SCHEDULING | Facility: HOME HEALTH | Age: 89
End: 2024-07-12
Payer: MEDICARE

## 2024-07-14 ENCOUNTER — HOME CARE VISIT (OUTPATIENT)
Dept: HOSPICE | Facility: HOSPICE | Age: 89
End: 2024-07-14
Payer: MEDICARE

## 2024-07-15 ENCOUNTER — HOME CARE VISIT (OUTPATIENT)
Dept: SCHEDULING | Facility: HOME HEALTH | Age: 89
End: 2024-07-15
Payer: MEDICARE

## 2024-07-15 PROCEDURE — G0299 HHS/HOSPICE OF RN EA 15 MIN: HCPCS

## 2024-07-15 PROCEDURE — G0156 HHCP-SVS OF AIDE,EA 15 MIN: HCPCS

## 2024-07-17 ENCOUNTER — HOME CARE VISIT (OUTPATIENT)
Dept: SCHEDULING | Facility: HOME HEALTH | Age: 89
End: 2024-07-17
Payer: MEDICARE

## 2024-07-17 PROCEDURE — G0156 HHCP-SVS OF AIDE,EA 15 MIN: HCPCS

## 2024-07-18 ENCOUNTER — HOME CARE VISIT (OUTPATIENT)
Dept: HOSPICE | Facility: HOSPICE | Age: 89
End: 2024-07-18
Payer: MEDICARE

## 2024-07-18 PROCEDURE — G0299 HHS/HOSPICE OF RN EA 15 MIN: HCPCS

## 2024-07-19 ENCOUNTER — HOME CARE VISIT (OUTPATIENT)
Dept: SCHEDULING | Facility: HOME HEALTH | Age: 89
End: 2024-07-19
Payer: MEDICARE

## 2024-07-19 PROCEDURE — G0156 HHCP-SVS OF AIDE,EA 15 MIN: HCPCS

## 2024-07-22 ENCOUNTER — HOME CARE VISIT (OUTPATIENT)
Dept: SCHEDULING | Facility: HOME HEALTH | Age: 89
End: 2024-07-22
Payer: MEDICARE

## 2024-07-22 PROCEDURE — G0156 HHCP-SVS OF AIDE,EA 15 MIN: HCPCS

## 2024-07-24 ENCOUNTER — HOME CARE VISIT (OUTPATIENT)
Dept: SCHEDULING | Facility: HOME HEALTH | Age: 89
End: 2024-07-24
Payer: MEDICARE

## 2024-07-24 PROCEDURE — G0156 HHCP-SVS OF AIDE,EA 15 MIN: HCPCS

## 2024-07-26 ENCOUNTER — HOME CARE VISIT (OUTPATIENT)
Dept: SCHEDULING | Facility: HOME HEALTH | Age: 89
End: 2024-07-26
Payer: MEDICARE

## 2024-07-26 PROCEDURE — G0156 HHCP-SVS OF AIDE,EA 15 MIN: HCPCS

## 2024-07-27 ENCOUNTER — HOME CARE VISIT (OUTPATIENT)
Dept: SCHEDULING | Facility: HOME HEALTH | Age: 89
End: 2024-07-27
Payer: MEDICARE

## 2024-07-27 VITALS — SYSTOLIC BLOOD PRESSURE: 115 MMHG | DIASTOLIC BLOOD PRESSURE: 81 MMHG | HEART RATE: 75 BPM

## 2024-07-27 VITALS
SYSTOLIC BLOOD PRESSURE: 118 MMHG | TEMPERATURE: 98.1 F | HEART RATE: 77 BPM | RESPIRATION RATE: 14 BRPM | DIASTOLIC BLOOD PRESSURE: 82 MMHG

## 2024-07-27 PROCEDURE — G0299 HHS/HOSPICE OF RN EA 15 MIN: HCPCS

## 2024-07-27 NOTE — HOSPICE
a 89yo with a hospice diagnosis of Alzheimer's disease with behavioral disturbance      *Stage 2, Right foot, second 2nd toe:   Wound presents as 1cm x 1cm, pink/red, erythema, a small amount of thin sanguineous drainage without odor. Audelia-wound is blanchable erythema but intact. Defined edges.    *Wound orders: Stage 2, Right foot, second 2nd toe:   SN to perform dressing changes. Performed hand hygiene. Removed soiled dressing. Cleansed with wound cleanser. Patted dry. Applied hydrogel to the affected area and covered with a nonstick bandage.       *Pt tolerated dreeing change with mild discomfort.      SUMMARY  *Code status: DNR    *Mental Status (MS)/Level of consciousness (LOC): On arrival, pt is in his shared apartment with his spouse sitting in his wheelchair with his eyes closed. Pt's name is called out and he responds timely to verbal stimuli via he attempts to communicate as he presents expressive aphasia. Pt can sometimes construct an appropriate sentence but mainly has difficulty finding words to express himself.       Pt is not completely able to make needs known and must be monitored for needs.  Pt is assessed for pain. Via PainAD pain scale 0/10. Pt has Schedule and PRN pain medication available.     *Palliative Performance Scale (PPS): =40%.    *Appetite/Nutritional Intake: Facility staff encourage to offer fluids regularly. Facility staff reports a lack of appetite, nutritional intake has decreased or pt will refuse to eat altogether.   *Weight-loss/Mid Upper Arm Circumference (MUAC): Pt is experiencing some notable weight-loss AEB looser fitting clothing and a decrease in in Mid Upper Arm Circumference (MUAC) and calf circumference.        *Urinary/Gastrointestinal: Bowel sounds present in all quads. BMs are reported less daily. Pt is incontinent of bowel & bladder (B&B).      *Musculoskeletal/Mobility: Pt utilizes a wheelchair for mobility and can no longer “foot-walk” himself. Pt is weaker and

## 2024-07-27 NOTE — HOSPICE
a 89yo with a hospice diagnosis of Alzheimer's disease with behavioral disturbance      On arrival, pt is in his shared apartment with his spouse sitting in his wheelchair with his eyes closed. Pt's name is called out and he responds timely to verbal stimuli via he attempts to communicate as he presents expressive aphasia. Pt can sometimes construct an appropriate sentence but mainly has difficulty finding words to express himself.     Pt is not completely able to make needs known and must be monitored for needs.  Pt is assessed for pain. Via PainAD pain scale 0/10. Pt has Schedule and PRN pain medication available.       *Stage 2, Right foot, second 2nd toe:   Wound presents as 1cm x 1cm, pink/red, erythema, a small amount of thin sanguineous drainage without odor. Audelia-wound is blanchable erythema but intact. Defined edges.    *Wound orders: Stage 2, Right foot, second 2nd toe:   SN to perform dressing changes. Performed hand hygiene. Removed soiled dressing. Cleansed with wound cleanser. Patted dry. Applied hydrogel to the affected area and covered with a nonstick bandage.       *Pt tolerated dreeing change with mild discomfort.            *Pt resides at [facility] and shares an apartment with his spouse. Coordination/Collaboration of pt's care occurs in various ways with the hospice Skilled Nurse (SN) and hospice care team via pt visits, the facility staff, pt's facility care plan meetings and during the hospice interdisciplinary team's biweekly meetings (IDG). Hospice drives pt's care, therefore; the facility staff are advised to contact [facility] ([facility]) [phone] regarding any pt concerns, fall(s) and/or any change in pt's condition. Facility staff voices an understanding regarding call hospice first, not 911.

## 2024-07-27 NOTE — HOSPICE
SN visit for wound care to left second toe. Pt found sleeping upright in wheelchair in room at [facility]. Pt rouses to tactile stimuli. Vital signs obtained. Pt denies pain and FLACC completed. Wound care performed per order. Wound appears to be healing very well. Facility staff notified of visit. Encouraged staff to call [facility] with any needs, questions or concerns.

## 2024-07-29 ENCOUNTER — HOME CARE VISIT (OUTPATIENT)
Dept: SCHEDULING | Facility: HOME HEALTH | Age: 89
End: 2024-07-29
Payer: MEDICARE

## 2024-07-29 PROCEDURE — G0156 HHCP-SVS OF AIDE,EA 15 MIN: HCPCS

## 2024-07-31 ENCOUNTER — HOME CARE VISIT (OUTPATIENT)
Dept: SCHEDULING | Facility: HOME HEALTH | Age: 89
End: 2024-07-31
Payer: MEDICARE

## 2024-07-31 PROCEDURE — G0156 HHCP-SVS OF AIDE,EA 15 MIN: HCPCS
